# Patient Record
Sex: FEMALE | Race: WHITE | NOT HISPANIC OR LATINO | URBAN - METROPOLITAN AREA
[De-identification: names, ages, dates, MRNs, and addresses within clinical notes are randomized per-mention and may not be internally consistent; named-entity substitution may affect disease eponyms.]

---

## 2018-04-04 LAB
C TRACH RRNA SPEC DONR QL NAA+PROBE: NEGATIVE
EXTERNAL ABO GROUPING: (no result)
EXTERNAL ANTIBODY SCREEN: NORMAL
EXTERNAL HEMATOCRIT: 41 %
EXTERNAL HEMOGLOBIN: 13.5 G/DL
EXTERNAL HEPATITIS B SURFACE ANTIGEN: NEGATIVE
EXTERNAL PLATELET COUNT: 232 K/ΜL
EXTERNAL RH FACTOR: POSITIVE
EXTERNAL SYPHILIS RPR SCREEN: (no result)
HIV 1+2 AB+HIV1 P24 AG SERPL QL IA: NEGATIVE
N GONORRHOEA DNA SPEC QL NAA+PROBE: NEGATIVE
RUBV IGG SERPL IA-ACNC: (no result)

## 2018-07-10 ENCOUNTER — INITIAL PRENATAL (OUTPATIENT)
Dept: OBSTETRICS AND GYNECOLOGY | Facility: CLINIC | Age: 38
End: 2018-07-10

## 2018-07-10 VITALS
BODY MASS INDEX: 28 KG/M2 | HEIGHT: 64 IN | WEIGHT: 164 LBS | SYSTOLIC BLOOD PRESSURE: 110 MMHG | DIASTOLIC BLOOD PRESSURE: 60 MMHG

## 2018-07-10 DIAGNOSIS — Z3A.25 25 WEEKS GESTATION OF PREGNANCY: Primary | ICD-10-CM

## 2018-07-10 PROBLEM — F32.A DEPRESSION: Status: ACTIVE | Noted: 2018-07-10

## 2018-07-10 PROBLEM — Z98.891 H/O: CESAREAN SECTION: Status: ACTIVE | Noted: 2018-07-10

## 2018-07-10 PROBLEM — N80.9 ENDOMETRIOSIS DETERMINED BY LAPAROSCOPY: Status: ACTIVE | Noted: 2018-07-10

## 2018-07-10 PROBLEM — Z90.49 S/P CHOLECYSTECTOMY: Status: ACTIVE | Noted: 2018-07-10

## 2018-07-10 PROCEDURE — 99214 OFFICE O/P EST MOD 30 MIN: CPT | Performed by: OBSTETRICS & GYNECOLOGY

## 2018-07-10 RX ORDER — PRENATAL VIT/IRON FUM/FOLIC AC 27MG-0.8MG
1 TABLET ORAL DAILY
COMMUNITY

## 2018-07-10 RX ORDER — BUTALBITAL, ACETAMINOPHEN AND CAFFEINE 50; 325; 40 MG/1; MG/1; MG/1
TABLET ORAL
COMMUNITY
Start: 2018-06-26 | End: 2018-10-25

## 2018-07-10 NOTE — PROGRESS NOTES
Chief Complaint   Patient presents with   • Initial Prenatal Visit       HPI: Magnolia is a  currently at 24w5d who today reports the following:  Nausea - No; Vaginal bleeding -  No; Heartburn - No.    ROS:  GI: Constipation - No; Diarrhea - No    Neuro: Headache - No; Visual change - No      EXAM:  Vitals: See prenatal flowsheet   Abdomen: See prenatal flowsheet   Urine glucose/protein: See prenatal flowsheet   Pelvic: See prenatal flowsheet     Prenatal Labs  Lab Results   Component Value Date    HGB 13.5 2018    RUBELLAABIGG immune 2018    HEPBSAG Negative 2018    ABSCRN Normal 2018    [id number] negative 2018    CHLAMNAA negative 2018    NGONORRHON negative 2018       MDM:  Impression: 1. Supervision of high risk pregnancy  2. Previous C/S with route of delivery to be determined  3. History of  labor first pregnancy treated with terbutaline  4. AMA   Tests done today: 1. none   Topics discussed: 1. Continue with PNV's  2. Prenatal labs reviewed  3. Possible trial of labor after    4. History about 10 pound weight gain I'll start her on the 1800/2000-calorie diet sheet Evon we'll check Glucola on .    5. Last Evon to get copy of operative note  for  for breech    Tests scheduled today for her next visit:   none

## 2018-09-13 ENCOUNTER — DOCUMENTATION (OUTPATIENT)
Dept: OBSTETRICS AND GYNECOLOGY | Facility: CLINIC | Age: 38
End: 2018-09-13

## 2018-09-13 NOTE — PROGRESS NOTES
Phone call from Dr. Simon in the emergency room [facility].  Regarding Magnolia Lawson.  She's 34 weeks gestation presented with gush of fluid.  Urine is fairly normal with some white cells squamous cells 1+ bacteria.  He reports loss is closed nitrazine was negative ultrasound reveals what sounds like an KWADWO of 14.  Good fetal heart tones.  Suggested that she follow up to see me tomorrow in the office as long as no further leakage of fluid.  If so would need to come to labor and delivery.

## 2018-09-14 ENCOUNTER — ROUTINE PRENATAL (OUTPATIENT)
Dept: OBSTETRICS AND GYNECOLOGY | Facility: CLINIC | Age: 38
End: 2018-09-14

## 2018-09-14 VITALS — SYSTOLIC BLOOD PRESSURE: 114 MMHG | WEIGHT: 176 LBS | BODY MASS INDEX: 30.21 KG/M2 | DIASTOLIC BLOOD PRESSURE: 70 MMHG

## 2018-09-14 DIAGNOSIS — Z34.93 PRENATAL CARE IN THIRD TRIMESTER: Primary | ICD-10-CM

## 2018-09-14 DIAGNOSIS — Z98.891 H/O: CESAREAN SECTION: ICD-10-CM

## 2018-09-14 PROCEDURE — 87210 SMEAR WET MOUNT SALINE/INK: CPT | Performed by: OBSTETRICS & GYNECOLOGY

## 2018-09-14 PROCEDURE — 99214 OFFICE O/P EST MOD 30 MIN: CPT | Performed by: OBSTETRICS & GYNECOLOGY

## 2018-09-14 PROCEDURE — 83986 ASSAY PH BODY FLUID NOS: CPT | Performed by: OBSTETRICS & GYNECOLOGY

## 2018-09-14 RX ORDER — ASPIRIN 81 MG/1
81 TABLET ORAL DAILY
COMMUNITY
End: 2018-10-12 | Stop reason: HOSPADM

## 2018-09-14 NOTE — PROGRESS NOTES
Chief Complaint   Patient presents with   • Routine Prenatal Visit     clear vag discharge today       HPI: Magnolia is a  currently at 34w1d who today reports the following:  Contractions - YES - but less than 4/hour AND no associated change in vaginal discharge; Leaking - She was seen yesterday in [facility]'s or breathy candidate for possible ruptured membranes and amniotic fluid was normal on ultrasound there showed some bacteria but also squamous cells.; Vaginal bleeding -  No; Swelling of extremities - YES.    ROS:  GI: Nausea - No; Constipation - No; Diarrhea - No    Neuro: Headache - No; Visual change - No      EXAM:  Vitals: See prenatal flowsheet   Abdomen: See prenatal flowsheet   Urine glucose/protein: See prenatal flowsheet   Pelvic: No evidence of ruptured membranes and ballotable    MDM:   Impression: 1. Supervision of high risk pregnancy  2. Previous C/S with route of delivery to be determined  3. Question ruptured membranes.  However pH today is normal negative Valsalva some white watery discharge microscopically no hyphae seen.   Tests done today: 1. PH and microscopic wet mount   Topics discussed: 1. Continue with PNV's  2. Prenatal labs reviewed  3. labor signs and symptoms  4. Route of delivery   Tests scheduled today for her next visit:   GBS

## 2018-09-21 ENCOUNTER — ROUTINE PRENATAL (OUTPATIENT)
Dept: OBSTETRICS AND GYNECOLOGY | Facility: CLINIC | Age: 38
End: 2018-09-21

## 2018-09-21 VITALS — DIASTOLIC BLOOD PRESSURE: 70 MMHG | WEIGHT: 179 LBS | BODY MASS INDEX: 30.73 KG/M2 | SYSTOLIC BLOOD PRESSURE: 110 MMHG

## 2018-09-21 DIAGNOSIS — R04.0 LEFT-SIDED NOSEBLEED: ICD-10-CM

## 2018-09-21 DIAGNOSIS — Z3A.35 35 WEEKS GESTATION OF PREGNANCY: Primary | ICD-10-CM

## 2018-09-21 DIAGNOSIS — Z34.93 PRENATAL CARE IN THIRD TRIMESTER: ICD-10-CM

## 2018-09-21 DIAGNOSIS — K59.01 SLOW TRANSIT CONSTIPATION: ICD-10-CM

## 2018-09-21 DIAGNOSIS — Z98.891 H/O: CESAREAN SECTION: ICD-10-CM

## 2018-09-21 LAB — EXTERNAL GROUP B STREP ANTIGEN: POSITIVE

## 2018-09-21 PROCEDURE — 99213 OFFICE O/P EST LOW 20 MIN: CPT | Performed by: OBSTETRICS & GYNECOLOGY

## 2018-09-21 RX ORDER — POLYETHYLENE GLYCOL 3350 17 G/17G
17 POWDER, FOR SOLUTION ORAL DAILY
Qty: 30 EACH | Refills: 2 | Status: SHIPPED | OUTPATIENT
Start: 2018-09-21 | End: 2018-10-25 | Stop reason: SDUPTHER

## 2018-09-21 NOTE — PROGRESS NOTES
Chief Complaint   Patient presents with   • Routine Prenatal Visit   • Contractions     irreg       HPI: Magnolia is a  currently at 35w1d who today reports the following:  Contractions - No; Leaking - No; Vaginal bleeding -  No; Swelling of extremities - No.    ROS:  GI: Nausea - No; Constipation - YES; Diarrhea - No    Neuro: Headache - No; Visual change - No      EXAM:  Vitals: See prenatal flowsheet   Abdomen: See prenatal flowsheet   Urine glucose/protein: See prenatal flowsheet   Pelvic: See prenatal flowsheet   MDM:   Impression: 1. Supervision of high risk pregnancy  2. Previous C/S with planned  - she has signed a needs to bring in the paperwork for the  consent.    3. Chronic constipation   4. Nosebleed despite being off baby aspirin.  She takes a Benadryl at night.  Suggest she may want to use Mucinex/Flonase/Zyrtec    Tests done today: 1. GBS testing   Topics discussed: 1. Continue with PNV's  2. Prenatal labs reviewed  3. labor signs and symptoms  4. Bloody show from examination today cervix os 1 cm   5. Will Rx MiraLAX    Tests scheduled today for her next visit:   none

## 2018-09-26 PROBLEM — B95.1 POSITIVE GBS TEST: Status: ACTIVE | Noted: 2018-09-26

## 2018-09-26 PROBLEM — O34.219 PATIENT DESIRES VAGINAL BIRTH AFTER CESAREAN SECTION (VBAC): Status: ACTIVE | Noted: 2018-09-26

## 2018-09-26 LAB — HCV AB S/CO SERPL IA: NEGATIVE

## 2018-10-01 ENCOUNTER — HOSPITAL ENCOUNTER (OUTPATIENT)
Facility: HOSPITAL | Age: 38
Setting detail: OBSERVATION
LOS: 1 days | Discharge: HOME | End: 2018-10-01
Attending: OBSTETRICS & GYNECOLOGY | Admitting: OBSTETRICS & GYNECOLOGY

## 2018-10-01 VITALS
SYSTOLIC BLOOD PRESSURE: 124 MMHG | HEART RATE: 72 BPM | DIASTOLIC BLOOD PRESSURE: 82 MMHG | TEMPERATURE: 97.9 F | HEIGHT: 65 IN | BODY MASS INDEX: 29.99 KG/M2 | RESPIRATION RATE: 16 BRPM | WEIGHT: 180 LBS

## 2018-10-01 PROBLEM — O47.9 FALSE LABOR: Status: ACTIVE | Noted: 2018-10-01

## 2018-10-01 LAB
ABO GROUP BLD: NORMAL
ALP SERPL-CCNC: 116 U/L (ref 25–100)
ALT SERPL W P-5'-P-CCNC: 12 U/L (ref 7–40)
AMPHET+METHAMPHET UR QL: NEGATIVE
AMPHETAMINES UR QL: NEGATIVE
AST SERPL-CCNC: 15 U/L (ref 0–33)
BACTERIA UR QL AUTO: NORMAL /HPF
BARBITURATES UR QL SCN: POSITIVE
BENZODIAZ UR QL SCN: NEGATIVE
BILIRUB SERPL-MCNC: 0.2 MG/DL (ref 0.3–1.2)
BILIRUB UR QL STRIP: NEGATIVE
BLD GP AB SCN SERPL QL: NEGATIVE
BUPRENORPHINE SERPL-MCNC: NEGATIVE NG/ML
CANNABINOIDS SERPL QL: NEGATIVE
CLARITY UR: CLEAR
COCAINE UR QL: NEGATIVE
COLOR UR: YELLOW
CREAT BLD-MCNC: 0.58 MG/DL (ref 0.6–1.3)
DEPRECATED RDW RBC AUTO: 47.6 FL (ref 37–54)
ERYTHROCYTE [DISTWIDTH] IN BLOOD BY AUTOMATED COUNT: 14.1 % (ref 11.3–14.5)
GLUCOSE UR STRIP-MCNC: NEGATIVE MG/DL
HCT VFR BLD AUTO: 38.2 % (ref 34.5–44)
HGB BLD-MCNC: 12.8 G/DL (ref 11.5–15.5)
HGB UR QL STRIP.AUTO: ABNORMAL
HYALINE CASTS UR QL AUTO: NORMAL /LPF
KETONES UR QL STRIP: NEGATIVE
LDH SERPL-CCNC: 182 U/L (ref 120–246)
LEUKOCYTE ESTERASE UR QL STRIP.AUTO: NEGATIVE
MCH RBC QN AUTO: 31.5 PG (ref 27–31)
MCHC RBC AUTO-ENTMCNC: 33.5 G/DL (ref 32–36)
MCV RBC AUTO: 94.1 FL (ref 80–99)
METHADONE UR QL SCN: NEGATIVE
NITRITE UR QL STRIP: NEGATIVE
OPIATES UR QL: NEGATIVE
OXYCODONE UR QL SCN: NEGATIVE
PCP UR QL SCN: NEGATIVE
PH UR STRIP.AUTO: 8.5 [PH] (ref 5–8)
PLATELET # BLD AUTO: 156 10*3/MM3 (ref 150–450)
PMV BLD AUTO: 12.8 FL (ref 6–12)
POC AMNISURE: NEGATIVE
PROPOXYPH UR QL: NEGATIVE
PROT UR QL STRIP: NEGATIVE
RBC # BLD AUTO: 4.06 10*6/MM3 (ref 3.89–5.14)
RBC # UR: NORMAL /HPF
REF LAB TEST METHOD: NORMAL
RH BLD: POSITIVE
SP GR UR STRIP: 1.01 (ref 1–1.03)
SQUAMOUS #/AREA URNS HPF: NORMAL /HPF
T&S EXPIRATION DATE: NORMAL
TRICYCLICS UR QL SCN: NEGATIVE
URATE SERPL-MCNC: 5.2 MG/DL (ref 3.1–7.8)
UROBILINOGEN UR QL STRIP: ABNORMAL
WBC NRBC COR # BLD: 12.41 10*3/MM3 (ref 3.5–10.8)
WBC UR QL AUTO: NORMAL /HPF

## 2018-10-01 PROCEDURE — 59025 FETAL NON-STRESS TEST: CPT

## 2018-10-01 PROCEDURE — G0008 ADMIN INFLUENZA VIRUS VAC: HCPCS | Performed by: OBSTETRICS & GYNECOLOGY

## 2018-10-01 PROCEDURE — 80306 DRUG TEST PRSMV INSTRMNT: CPT | Performed by: OBSTETRICS & GYNECOLOGY

## 2018-10-01 PROCEDURE — 96361 HYDRATE IV INFUSION ADD-ON: CPT

## 2018-10-01 PROCEDURE — 85027 COMPLETE CBC AUTOMATED: CPT | Performed by: OBSTETRICS & GYNECOLOGY

## 2018-10-01 PROCEDURE — 86901 BLOOD TYPING SEROLOGIC RH(D): CPT | Performed by: OBSTETRICS & GYNECOLOGY

## 2018-10-01 PROCEDURE — 84112 EVAL AMNIOTIC FLUID PROTEIN: CPT | Performed by: OBSTETRICS & GYNECOLOGY

## 2018-10-01 PROCEDURE — 81001 URINALYSIS AUTO W/SCOPE: CPT | Performed by: OBSTETRICS & GYNECOLOGY

## 2018-10-01 PROCEDURE — 82247 BILIRUBIN TOTAL: CPT | Performed by: OBSTETRICS & GYNECOLOGY

## 2018-10-01 PROCEDURE — 86850 RBC ANTIBODY SCREEN: CPT | Performed by: OBSTETRICS & GYNECOLOGY

## 2018-10-01 PROCEDURE — G0378 HOSPITAL OBSERVATION PER HR: HCPCS

## 2018-10-01 PROCEDURE — 83615 LACTATE (LD) (LDH) ENZYME: CPT | Performed by: OBSTETRICS & GYNECOLOGY

## 2018-10-01 PROCEDURE — 84075 ASSAY ALKALINE PHOSPHATASE: CPT | Performed by: OBSTETRICS & GYNECOLOGY

## 2018-10-01 PROCEDURE — 84550 ASSAY OF BLOOD/URIC ACID: CPT | Performed by: OBSTETRICS & GYNECOLOGY

## 2018-10-01 PROCEDURE — 84460 ALANINE AMINO (ALT) (SGPT): CPT | Performed by: OBSTETRICS & GYNECOLOGY

## 2018-10-01 PROCEDURE — 25010000002 INFLUENZA VAC SUBUNIT QUAD 0.5 ML SUSPENSION PREFILLED SYRINGE: Performed by: OBSTETRICS & GYNECOLOGY

## 2018-10-01 PROCEDURE — 90661 CCIIV3 VAC ABX FR 0.5 ML IM: CPT | Performed by: OBSTETRICS & GYNECOLOGY

## 2018-10-01 PROCEDURE — G0379 DIRECT REFER HOSPITAL OBSERV: HCPCS

## 2018-10-01 PROCEDURE — 87086 URINE CULTURE/COLONY COUNT: CPT | Performed by: OBSTETRICS & GYNECOLOGY

## 2018-10-01 PROCEDURE — 86900 BLOOD TYPING SEROLOGIC ABO: CPT | Performed by: OBSTETRICS & GYNECOLOGY

## 2018-10-01 PROCEDURE — 84450 TRANSFERASE (AST) (SGOT): CPT | Performed by: OBSTETRICS & GYNECOLOGY

## 2018-10-01 PROCEDURE — G0480 DRUG TEST DEF 1-7 CLASSES: HCPCS | Performed by: OBSTETRICS & GYNECOLOGY

## 2018-10-01 PROCEDURE — 25010000002 BUTORPHANOL PER 1 MG: Performed by: OBSTETRICS & GYNECOLOGY

## 2018-10-01 PROCEDURE — 96374 THER/PROPH/DIAG INJ IV PUSH: CPT

## 2018-10-01 PROCEDURE — 82565 ASSAY OF CREATININE: CPT | Performed by: OBSTETRICS & GYNECOLOGY

## 2018-10-01 RX ORDER — SODIUM CHLORIDE, SODIUM LACTATE, POTASSIUM CHLORIDE, CALCIUM CHLORIDE 600; 310; 30; 20 MG/100ML; MG/100ML; MG/100ML; MG/100ML
125 INJECTION, SOLUTION INTRAVENOUS CONTINUOUS
Status: DISCONTINUED | OUTPATIENT
Start: 2018-10-01 | End: 2018-10-01 | Stop reason: HOSPADM

## 2018-10-01 RX ORDER — BUTORPHANOL TARTRATE 1 MG/ML
2 INJECTION, SOLUTION INTRAMUSCULAR; INTRAVENOUS ONCE
Status: COMPLETED | OUTPATIENT
Start: 2018-10-01 | End: 2018-10-01

## 2018-10-01 RX ADMIN — BUTORPHANOL TARTRATE 2 MG: 1 INJECTION, SOLUTION INTRAMUSCULAR; INTRAVENOUS at 05:24

## 2018-10-01 RX ADMIN — MINERAL OIL, PETROLATUM, PHENYLEPHRINE HYDROCHLORIDE: 140; 749; 2.5 OINTMENT RECTAL; TOPICAL at 08:42

## 2018-10-01 RX ADMIN — WITCH HAZEL: 500 SOLUTION RECTAL; TOPICAL at 08:41

## 2018-10-01 RX ADMIN — INFLUENZA A VIRUS A/SINGAPORE/GP1908/2015 IVR-180 (H1N1) ANTIGEN (MDCK CELL DERIVED, PROPIOLACTONE INACTIVATED), INFLUENZA A VIRUS A/NORTH CAROLINA/04/2016 (H3N2) HEMAGGLUTININ ANTIGEN (MDCK CELL DERIVED, PROPIOLACTONE INACTIVATED), INFLUENZA B VIRUS B/IOWA/06/2017 HEMAGGLUTININ ANTIGEN (MDCK CELL DERIVED, PROPIOLACTONE INACTIVATED), INFLUENZA B VIRUS B/SINGAPORE/INFTT-16-0610/2016 HEMAGGLUTININ ANTIGEN (MDCK CELL DERIVED, PROPIOLACTONE INACTIVATED) 0.5 ML: 15; 15; 15; 15 INJECTION, SUSPENSION INTRAMUSCULAR at 09:39

## 2018-10-01 RX ADMIN — SODIUM CHLORIDE, POTASSIUM CHLORIDE, SODIUM LACTATE AND CALCIUM CHLORIDE 125 ML/HR: 600; 310; 30; 20 INJECTION, SOLUTION INTRAVENOUS at 05:17

## 2018-10-01 RX ADMIN — SODIUM CHLORIDE, POTASSIUM CHLORIDE, SODIUM LACTATE AND CALCIUM CHLORIDE 999 ML/HR: 600; 310; 30; 20 INJECTION, SOLUTION INTRAVENOUS at 04:48

## 2018-10-01 NOTE — H&P
[facility]  Obstetric History and Physical    Chief Complaint   Patient presents with   • pelvic pressure     For last couple of weeks   • Contractions     Pt went to hospital for pelvic pressure and back pain, contractions started at [facility]. Pt was 2 cm at [facility].         Patient is a 38 y.o. female  currently at 36w4d, who presents as a transfer from [facility]   She is a patient of Dr. Raya.  She presented to that hospital due to pelvic pressure and lower back pain.   While there, she started having contractions every few minutes.  She has a significant history of a primary C/S 14 years ago for breech.  She would like to TOLAC with this pregnancy if possible.  She says she has not had any other complications with this pregnancy although she look to have had limited prenatal care.   She also thought she may have started leaking in transit.   Her AmniSure is negative.   She denies bleeding.  Contractions are every 2 minutes currently.  Her platelets were 130s at the OSH.  She denies HA, RUQ pan and vision changes        .       Prenatal Information:  Prenatal Results     Initial Prenatal Labs     Test Value Reference Range Date Time    Hemoglobin 13.5 g/dL g/dL 18     Hematocrit 41 % % 18     Platelets 232 K/µL K/µL 18     Rubella IgG immune   18     Hepatitis B SAg Negative   18     Hepatitis C Ab negative   18     RPR Non-Reactive   18     ABO A   18     Rh Positive   18     Antibody Screen Normal  Normal 18     HIV negative   18     Urine Culture        Gonorrhea negative   18     Chlamydia negative   18     TSH              2nd and 3rd Trimester     Test Value Reference Range Date Time    Hemoglobin (repeated)        Hematocrit (repeated)        GCT        Antibody Screen (repeated)        GTT Fasting        GTT 1 Hr        GTT 2 Hr        GTT 3 Hr        Group B Strep              Drug Screening     Test Value  Reference Range Date Time    Amphetamine Screen        Barbiturate Screen        Benzodiazepine Screen        Methadone Screen        Phencyclidine Screen        Opiates Screen        THC Screen        Cocaine Screen        Propoxyphene Screen        Buprenorphine Screen        Methamphetamine Screen        Oxycodone Screen        Tryicyclic Antidepressants Screen              Other (Risk screening)     Test Value Reference Range Date Time    Varicella IgG        Parvovirus IgG        CMV IgG        Cystic Fibrosis        Hemoglobin electrophoresis        NIPT        MSAFP-4        AFP (for NTD only)                  External Prenatal Results     Pregnancy Outside Results - Transcribed From Office Records - See Scanned Records For Details     Test Value Date Time    Hgb 13.5 g/dL 04/04/18     Hct 41 % 04/04/18     ABO A  04/04/18     Rh Positive  04/04/18     Antibody Screen Normal  04/04/18     Glucose Fasting GTT       Glucose Tolerance Test 1 hour       Glucose Tolerance Test 3 hour       Gonorrhea (discrete) negative  04/04/18     Chlamydia (discrete) negative  04/04/18     RPR Non-Reactive  04/04/18     VDRL       Syphilis Antibody       Rubella immune  04/04/18     HBsAg Negative  04/04/18     Herpes Simplex Virus PCR       Herpes Simplex VIrus Culture       HIV negative  04/04/18     Hep C RNA Quant PCR       Hep C Antibody negative  09/26/18     AFP       Group B Strep       GBS Susceptibility to Clindamycin       GBS Susceptibility to Erythromycin       Fetal Fibronectin       Genetic Testing, Maternal Blood             Drug Screening     Test Value Date Time    Urine Drug Screen       Amphetamine Screen       Barbiturate Screen       Benzodiazepine Screen       Methadone Screen       Phencyclidine Screen       Opiates Screen       THC Screen       Cocaine Screen       Propoxyphene Screen       Buprenorphine Screen       Methamphetamine Screen       Oxycodone Screen       Tricyclic Antidepressants Screen                     Past OB History:     Obstetric History       T0      L1     SAB1   TAB0   Ectopic0   Molar0   Multiple0   Live Births1       # Outcome Date GA Lbr Cisco/2nd Weight Sex Delivery Anes PTL Lv   3 Current            2 2017           1 Para 04 40w0d   M CS-LTranv  Y HILARIO      Name: James          Past Medical History: Past Medical History:   Diagnosis Date   • Endometriosis    • Migraine       Past Surgical History Past Surgical History:   Procedure Laterality Date   •  SECTION     • CHOLECYSTECTOMY     • DIAGNOSTIC LAPAROSCOPY      times 3      Family History: Family History   Problem Relation Age of Onset   • Ovarian cancer Maternal Grandmother       Social History:  reports that she has never smoked. She has never used smokeless tobacco.   reports that she does not drink alcohol.   reports that she does not use drugs.        Review of Systems:   Denies chest pain, SOA, muscle weakness and rashes.  All other pertnant positives and negatives are addressed in the HPI      Objective     Vital Signs Range for the last 24 hours  Temperature: Temp:  [97.9 °F (36.6 °C)-98.2 °F (36.8 °C)] 97.9 °F (36.6 °C)   Temp Source: Temp src: Oral   BP: BP: (124-132)/(77-82) 124/82   Pulse: Heart Rate:  [72-82] 72   Respirations: Resp:  [16-18] 16   SPO2:     O2 Amount (l/min):     O2 Devices     Weight: Weight:  [81.6 kg (180 lb)] 81.6 kg (180 lb)     Physical Examination: General appearance - oriented to person, place, and time and appears to be very uncomfortable with contractions.   Chest - no tachypnea, retractions or cyanosis  Heart - normal rate and regular rhythm, S1 and S2 normal  Abdomen - soft, nontender, nondistended, no masses or organomegaly  no rebound tenderness noted  bowel sounds normal  Initially had moderate contractions, but after IV hydration and pain medications, she now has very mild contractions that she does not feel.   Extremities - pedal edema 1  +    Presentation: Cephalic   Cervix: Exam by:     Dilation: Cervical Dilation (cm): 1   Effacement: Cervical Effacement: 70%   Station:       Fetal Heart Rate Assessment   Method:     Beats/min:     Baseline:  130s   Variability:  mod   Accels:  y   Decels:  n   Tracing Category:  1     Uterine Assessment   Method:     Frequency (min):  Q2-4   Ctx Count in 10 min:     Duration:     Intensity:  mild   Intensity by IUPC:     Resting Tone:     Resting Tone by IUPC:     [place] Units:       Laboratory Results:   Lab Results   Component Value Date     10/01/2018    HGB 12.8 10/01/2018    HCT 38.2 10/01/2018    WBC 12.41 (H) 10/01/2018     Lab Results   Component Value Date    HGB 12.8 10/01/2018     10/01/2018    AST 15 10/01/2018    ALT 12 10/01/2018     10/01/2018    URICACID 5.2 10/01/2018    CREATININE 0.58 (L) 10/01/2018         Assessment/Plan         Assessment & Plan    Assessment:  1.  Intrauterine pregnancy at 36w4d gestation with reactive fetal status.    2.  No signs or symptoms of labour or PPROM  3.  To be a TOLAC    Plan:  1. Reassuring fetal status  2. Currently no s/s of KELLY or PPROM  3. Will observe a little longer to assure that she is stable.  4. Dr. Raya notified and will be by later to see her and likely viridianarge      Jeremie Zuñiga MD  10/1/2018  4:40 AM

## 2018-10-01 NOTE — PLAN OF CARE
Problem: Patient Care Overview  Goal: Plan of Care Review  Outcome: Outcome(s) achieved Date Met: 10/01/18   10/01/18 0856   Coping/Psychosocial   Plan of Care Reviewed With patient;family   Plan of Care Review   Progress improving   OTHER   Outcome Summary Ctx frequency and intensity decreased. No cervical change noted as per Dr. Zuñiga. May d/c home with instrucitons to return as needed for continued ctx, LOF, VB, any other concerns.     Goal: Individualization and Mutuality  Outcome: Outcome(s) achieved Date Met: 10/01/18    Goal: Discharge Needs Assessment  Outcome: Outcome(s) achieved Date Met: 10/01/18   10/01/18 0856   Discharge Needs Assessment   Concerns to be Addressed denies needs/concerns at this time   Patient/Family Anticipates Transition to home with family   Patient/Family Anticipated Services at Transition none   Transportation Concerns car, none   Transportation Anticipated family or friend will provide   Anticipated Changes Related to Illness none   Equipment Needed After Discharge none   Disability   Equipment Currently Used at Home none

## 2018-10-01 NOTE — DISCHARGE INSTRUCTIONS
Keep all follow-up appointments.    Friday 10/12/18 @ 8:40AM  Call Dr. Raya' office with any questions or concerns.  Return to the hospital if you have any vaginal bleeding, if you think your water is broken, or if you are having regular contractions.

## 2018-10-01 NOTE — DISCHARGE SUMMARY
L Sandra CARRILLO  Patient Name: Magnolia Lawson  : 1980  MRN: [id number]  Date of Service: 10/1/2018  Referring Provider: Se Raya     ID: 38 y.o.  at 36w4d seen for c/o pressure and contractions. Cervix 1/70% and mild contractions; not breathing through them. Discussed when to return 1-5-1 rule and breathing through them.    Admission Diagnosis: False labor [O47.9]  False labor [O47.9]    Discharge Diagnosis:   Patient Active Problem List   Diagnosis   • Prenatal care in third trimester   • Low transverse  section 2004 at 38 6/7 breeech   • Endometriosis determined by laparoscopy  and    • S/P cholecystectomy 2004   • Depression   • Slow transit constipation   • Positive GBS test 2018   • Patient desires vaginal birth after  section ()    • False labor       Date of Admission: 10/1/2018  4:04 AM    Date of Discharge: 10/1/2018    Discharge Condition: Stable    Discharge to: Home    Discharge Medications:      Discharge Medications      Continue These Medications      Instructions Start Date   aspirin 81 MG EC tablet   81 mg, Oral, Daily      butalbital-acetaminophen-caffeine -40 MG per tablet  Commonly known as:  FIORICET, ESGIC   No dose, route, or frequency recorded.      CALCIUM 500 + D3 PO   Oral      polyethylene glycol powder  Commonly known as:  MIRALAX   17 g, Oral, Daily, Dissolve in large glass of water and take daily      prenatal vitamin 27-0.8 27-0.8 MG tablet tablet   1 tablet, Oral, Daily             Discharge Diet:     Discharge Activity:    Follow up appointments:   Your Scheduled Appointments    Oct 03, 2018 11:30 AM EDT  OB FOLLOWUP with Se Raya MD  [facility] Post (--) [address] [place] [phone]          Hospital summary:          .  Her condition was determined to be appropriate for outpatient management and she was discharged home in stable  condition.  She was instructed to follow up in 1 week with Dr Raya, she may reschedule her Weds appt in 2 days for next week..    Se Raya MD  8:15 AM

## 2018-10-02 LAB — REF LAB TEST METHOD: NORMAL

## 2018-10-03 ENCOUNTER — NURSE TRIAGE (OUTPATIENT)
Dept: CALL CENTER | Facility: HOSPITAL | Age: 38
End: 2018-10-03

## 2018-10-03 LAB — BACTERIA SPEC AEROBE CULT: NORMAL

## 2018-10-04 ENCOUNTER — DOCUMENTATION (OUTPATIENT)
Dept: OBSTETRICS AND GYNECOLOGY | Facility: CLINIC | Age: 38
End: 2018-10-04

## 2018-10-04 NOTE — PROGRESS NOTES
I left a voicemail regarding her swelling- my phone volume was too low for me to hear the page ~ [place] last night. I saw it at 0630; read note regarding edema. Told her if anyheadache, visual symptoms, nausea or pain to come in and get BP check and urinary protein. We can work her in to be seen sooner than her next appt. She was at L&D Monday October 1st.

## 2018-10-04 NOTE — TELEPHONE ENCOUNTER
"    Reason for Disposition  • [1] Pregnant 23 or more weeks AND [2] baby is moving less today (e.g., kick count < 5 in 1 hour or < 10 in 2 hours)    Additional Information  • Negative: Severe difficulty breathing (e.g., struggling for each breath, speaks in single words)  • Negative: Sounds like a life-threatening emergency to the triager  • Negative: Followed a leg injury  • Negative: [1] Small area of swelling AND [2] followed an insect bite to the area  • Negative: Swelling only of ankle  • Negative: Swelling only of knee  • Negative: SEVERE leg swelling (e.g., swelling extends above knee, entire leg is swollen)  • Negative: Chest pain  • Negative: [1] Difficulty breathing AND [2] new onset or worsening  • Negative: [1] Pregnant > 20 weeks AND [2] new blurred vision or vision changes  • Negative: [1] Pregnant > 20 weeks AND [2] severe headache AND [3] not relieved with acetaminophen (e.g., Tylenol)  • Negative: [1] Pregnant > 20 weeks AND [2] upper abdominal pain lasts > 1 hour  • Negative: [1] Red area or streak AND [2] fever    Answer Assessment - Initial Assessment Questions  1. ONSET: \"When did the swelling start?\" (e.g., minutes, hours, days)      Tonight, 1800  2. LOCATION: \"What part of the leg is swollen?\"  \"Are both legs swollen or just one leg?\"      Feet, ankles and calves of both legs  3. SEVERITY: \"How bad is the swelling?\" (e.g., localized; mild, moderate, severe)   - Localized - small area of swelling localized to one leg   - MILD pedal edema - swelling limited to foot and ankle, pitting edema < 1/4 inch (6 mm) deep, rest and elevation eliminate most or all swelling   - MODERATE edema - swelling of lower leg to knee, pitting edema > 1/4 inch (6 mm) deep, rest and elevation only partially reduce swelling   - SEVERE edema - swelling extends above knee, facial or hand swelling present       Moderate, greater than 1/4 inch  4. REDNESS: \"Does the swelling look red or infected?\"      red  5. PAIN: \"Is " "the swelling painful to touch?\" If so, ask: \"How painful is it?\"   (Scale 1-10; mild, moderate or severe)      Denies pain, states they feel like they are going to bust when she stands up.  6. FEVER: \"Do you have a fever?\" If so, ask: \"What is it, how was it measured, and when did it start?\"       denies  7. CAUSE: \"What do you think is causing the leg swelling?\"      pregnancy  8. MEDICAL HISTORY: \"Do you have a history of blood clots, heart failure, kidney disease, liver failure, or cancer?\"      denies  9. OTHER SYMPTOMS: \"Do you have any other symptoms?\" (e.g., chest pain, difficulty breathing)      denies  10. LAURY: \"What date are you expecting to deliver?\"        10/25/18 second pregnancy    Protocols used: PREGNANCY - LEG SWELLING AND EDEMA-ADULT-AH      "

## 2018-10-04 NOTE — TELEPHONE ENCOUNTER
Called back to say the baby is moving more and wants to know if she should still go to L&D to be checked out.  Legs are still swollen, she states her legs feel like they are going to explode.  I looked up Dr. Raya's office number and transferred the call to that number so she could talk to the physician on call.    Reason for Disposition  • [1] Red area or streak AND [2] large (> 2 in. or 5 cm)    Additional Information  • Negative: Severe difficulty breathing (e.g., struggling for each breath, speaks in single words)  • Negative: Sounds like a life-threatening emergency to the triager  • Negative: Followed a leg injury  • Negative: [1] Small area of swelling AND [2] followed an insect bite to the area  • Negative: Swelling only of ankle  • Negative: Swelling only of knee  • Negative: SEVERE leg swelling (e.g., swelling extends above knee, entire leg is swollen)  • Negative: Chest pain  • Negative: [1] Difficulty breathing AND [2] new onset or worsening  • Negative: [1] Pregnant > 20 weeks AND [2] new blurred vision or vision changes  • Negative: [1] Pregnant > 20 weeks AND [2] severe headache AND [3] not relieved with acetaminophen (e.g., Tylenol)  • Negative: [1] Pregnant > 20 weeks AND [2] upper abdominal pain lasts > 1 hour  • Negative: [1] Pregnant 23 or more weeks AND [2] baby is moving less today (e.g., kick count < 5 in 1 hour or < 10 in 2 hours)  • Negative: [1] Red area or streak AND [2] fever  • Negative: [1] Swelling is painful to touch AND [2] fever  • Negative: [1] Cast on leg or ankle AND [2] now increased pain  • Negative: Patient sounds very sick or weak to the triager  • Negative: [1] Pregnant > 20 weeks AND [2] swelling of face, arm or hands  (Exception: slight puffiness of fingers)  • Negative: [1] Pregnant > 20 weeks AND [2] sudden weight gain (i.e., more than 3 lbs or 1.4 kg in one week)  • Negative: [1] Thigh or calf pain AND [2] only 1 side AND [3] present > 1 hour  • Negative: [1] Thigh,  "calf, or ankle swelling AND [2] only 1 side  • Negative: [1] Thigh, calf, or ankle swelling AND [2] bilateral AND [3] 1 side is more swollen    Answer Assessment - Initial Assessment Questions  1. ONSET: \"When did the swelling start?\" (e.g., minutes, hours, days)      tonight  2. LOCATION: \"What part of the leg is swollen?\"  \"Are both legs swollen or just one leg?\"      Feet, ankles, calves of both legs  3. SEVERITY: \"How bad is the swelling?\" (e.g., localized; mild, moderate, severe)   - Localized - small area of swelling localized to one leg   - MILD pedal edema - swelling limited to foot and ankle, pitting edema < 1/4 inch (6 mm) deep, rest and elevation eliminate most or all swelling   - MODERATE edema - swelling of lower leg to knee, pitting edema > 1/4 inch (6 mm) deep, rest and elevation only partially reduce swelling   - SEVERE edema - swelling extends above knee, facial or hand swelling present       moderate  4. REDNESS: \"Does the swelling look red or infected?\"      red  5. PAIN: \"Is the swelling painful to touch?\" If so, ask: \"How painful is it?\"   (Scale 1-10; mild, moderate or severe)      Denies pain until she stands, says they feel like they will explode  6. FEVER: \"Do you have a fever?\" If so, ask: \"What is it, how was it measured, and when did it start?\"       denies  7. CAUSE: \"What do you think is causing the leg swelling?\"      pregnancy  8. MEDICAL HISTORY: \"Do you have a history of blood clots, heart failure, kidney disease, liver failure, or cancer?\"      denies  9. OTHER SYMPTOMS: \"Do you have any other symptoms?\" (e.g., chest pain, difficulty breathing)      denies  10. LAURY: \"What date are you expecting to deliver?\"        End of this month    Protocols used: PREGNANCY - LEG SWELLING AND EDEMA-ADULT-AH      "

## 2018-10-05 ENCOUNTER — HOSPITAL ENCOUNTER (OUTPATIENT)
Facility: HOSPITAL | Age: 38
Setting detail: OBSERVATION
Discharge: HOME | End: 2018-10-06
Attending: OBSTETRICS & GYNECOLOGY | Admitting: OBSTETRICS & GYNECOLOGY

## 2018-10-05 VITALS
DIASTOLIC BLOOD PRESSURE: 83 MMHG | RESPIRATION RATE: 18 BRPM | TEMPERATURE: 98.1 F | SYSTOLIC BLOOD PRESSURE: 129 MMHG | HEART RATE: 85 BPM

## 2018-10-05 PROCEDURE — G0378 HOSPITAL OBSERVATION PER HR: HCPCS

## 2018-10-05 PROCEDURE — 59025 FETAL NON-STRESS TEST: CPT

## 2018-10-06 PROBLEM — O47.1 FALSE LABOR AFTER 37 WEEKS OF GESTATION WITHOUT DELIVERY: Status: ACTIVE | Noted: 2018-10-06

## 2018-10-06 PROCEDURE — 99218 PR INITIAL OBSERVATION CARE/DAY 30 MINUTES: CPT | Performed by: OBSTETRICS & GYNECOLOGY

## 2018-10-06 RX ORDER — ZOLPIDEM TARTRATE 5 MG/1
5 TABLET ORAL ONCE
Status: COMPLETED | OUTPATIENT
Start: 2018-10-06 | End: 2018-10-06

## 2018-10-06 RX ADMIN — ZOLPIDEM TARTRATE 5 MG: 5 TABLET ORAL at 00:07

## 2018-10-06 NOTE — H&P
Magnolia ROBBINS Renny  1980  [id number]  [id number]    CC: contractions  HPI:  Patient is 38 y.o. white female   currently at 37w2d  Presents with c/o uterine contractions.  Onset ~1700, intermittent, non-radiating, variable intensity, denies assoc vag bleeding or SROM.  Good FM.  PNC comp by Adv mat age and prev  (pt desires ).    PMH:  Current meds: PNV, Ca, vit D  Illnesses: migraines, endometriosis  Surgeries:  (breech), lap papi, diag laparoscopy X 2  Allergies: NKDA    Past OB History:       Obstetric History       T1      L1     SAB1   TAB0   Ectopic0   Molar0   Multiple0   Live Births1       # Outcome Date GA Lbr Cisco/2nd Weight Sex Delivery Anes PTL Lv   3 Current            2 2017           1 Term 04 40w0d   M CS-LTranv  Y HILARIO      Name: James               SH: tob neg , EtOH neg, drugs neg  FH: heart dz pos , diabetes pos , cancer pos    General ROS: HA, N (mild), edema.   All other systems reviewed and are negative.      Physical Examination: General appearance - alert, well appearing, and in no distress  Vital signs - /83   Pulse 85   Temp 98.1 °F (36.7 °C) (Oral)   Resp 18   HEENT: normocephalic, atraumatic,oropharynx clear, appearance of ears and nose normal  Neck - supple, no significant adenopathy, no thyromegaly  Lymphatics - no palpable lymphadenopathy in the neck or groin, no hepatosplenomegaly  Chest - clear to auscultation, no wheezes, rales or rhonchi, respiratory effort non-labored  Heart - normal rate, regular rhythm, normal S1, S2, no murmurs, rubs, clicks or gallops, no JVD, tr lower extremity edema  Abdomen - soft, nontender, nondistended, no masses, no hepatosplenomegaly  no rebound tenderness noted, bowel sounds normal  Vaginal Exam: 1/70%/-2, no blood in vault ,external genitalia normal  Extremities - trace pedal edema noted, no calf tend  Skin -warm and dry, normal coloration and turgor, no rashes, no suspicious skin  lesions noted        Fetal monitoring: indication contractions , onset 2100 , offset 2200 , baseline 155 , mod BTB variability , multiple accels (15 X 15), no decels, irreg contractions, interpretation reactive NST    Radiology     Assessment 1)IUP 37 1/7 weeks    2)prev     3)adv mat age   4)false labor    Plan 1)observe     2)home     3)ambien    4)keep next sched appt    Denzel Loo MD  10/6/2018  12:00 AM

## 2018-10-10 ENCOUNTER — HOSPITAL ENCOUNTER (INPATIENT)
Facility: HOSPITAL | Age: 38
LOS: 2 days | Discharge: HOME | End: 2018-10-12
Attending: OBSTETRICS & GYNECOLOGY | Admitting: OBSTETRICS & GYNECOLOGY

## 2018-10-10 ENCOUNTER — ANESTHESIA EVENT (OUTPATIENT)
Dept: LABOR AND DELIVERY | Facility: HOSPITAL | Age: 38
End: 2018-10-10

## 2018-10-10 ENCOUNTER — ANESTHESIA (OUTPATIENT)
Dept: LABOR AND DELIVERY | Facility: HOSPITAL | Age: 38
End: 2018-10-10

## 2018-10-10 PROBLEM — O34.219 DESIRES VBAC (VAGINAL BIRTH AFTER CESAREAN) TRIAL: Status: ACTIVE | Noted: 2018-10-10

## 2018-10-10 PROBLEM — O34.219 VBAC, DELIVERED: Status: ACTIVE | Noted: 2018-10-10

## 2018-10-10 PROBLEM — O47.9 FALSE LABOR: Status: RESOLVED | Noted: 2018-10-01 | Resolved: 2018-10-10

## 2018-10-10 PROBLEM — O47.1 FALSE LABOR AFTER 37 WEEKS OF GESTATION WITHOUT DELIVERY: Status: RESOLVED | Noted: 2018-10-06 | Resolved: 2018-10-10

## 2018-10-10 LAB
ABO GROUP BLD: NORMAL
ALP SERPL-CCNC: 120 U/L (ref 25–100)
ALT SERPL W P-5'-P-CCNC: 13 U/L (ref 7–40)
AST SERPL-CCNC: 16 U/L (ref 0–33)
BILIRUB SERPL-MCNC: 0.3 MG/DL (ref 0.3–1.2)
BLD GP AB SCN SERPL QL: NEGATIVE
CREAT BLD-MCNC: 0.74 MG/DL (ref 0.6–1.3)
DEPRECATED RDW RBC AUTO: 48.9 FL (ref 37–54)
ERYTHROCYTE [DISTWIDTH] IN BLOOD BY AUTOMATED COUNT: 14.3 % (ref 11.3–14.5)
HCT VFR BLD AUTO: 41 % (ref 34.5–44)
HGB BLD-MCNC: 13.8 G/DL (ref 11.5–15.5)
LDH SERPL-CCNC: 196 U/L (ref 120–246)
MCH RBC QN AUTO: 31.8 PG (ref 27–31)
MCHC RBC AUTO-ENTMCNC: 33.7 G/DL (ref 32–36)
MCV RBC AUTO: 94.5 FL (ref 80–99)
PLATELET # BLD AUTO: 145 10*3/MM3 (ref 150–450)
PMV BLD AUTO: 13.7 FL (ref 6–12)
RBC # BLD AUTO: 4.34 10*6/MM3 (ref 3.89–5.14)
RH BLD: POSITIVE
T&S EXPIRATION DATE: NORMAL
URATE SERPL-MCNC: 6.2 MG/DL (ref 3.1–7.8)
WBC NRBC COR # BLD: 13.35 10*3/MM3 (ref 3.5–10.8)

## 2018-10-10 PROCEDURE — 82565 ASSAY OF CREATININE: CPT | Performed by: OBSTETRICS & GYNECOLOGY

## 2018-10-10 PROCEDURE — 86901 BLOOD TYPING SEROLOGIC RH(D): CPT | Performed by: OBSTETRICS & GYNECOLOGY

## 2018-10-10 PROCEDURE — C1755 CATHETER, INTRASPINAL: HCPCS | Performed by: ANESTHESIOLOGY

## 2018-10-10 PROCEDURE — 25010000002 FENTANYL CITRATE (PF) 100 MCG/2ML SOLUTION: Performed by: NURSE ANESTHETIST, CERTIFIED REGISTERED

## 2018-10-10 PROCEDURE — 25010000002 ROPIVACAINE PER 1 MG: Performed by: NURSE ANESTHETIST, CERTIFIED REGISTERED

## 2018-10-10 PROCEDURE — 0KQM0ZZ REPAIR PERINEUM MUSCLE, OPEN APPROACH: ICD-10-PCS | Performed by: OBSTETRICS & GYNECOLOGY

## 2018-10-10 PROCEDURE — 51703 INSERT BLADDER CATH COMPLEX: CPT

## 2018-10-10 PROCEDURE — 85027 COMPLETE CBC AUTOMATED: CPT | Performed by: OBSTETRICS & GYNECOLOGY

## 2018-10-10 PROCEDURE — C1755 CATHETER, INTRASPINAL: HCPCS

## 2018-10-10 PROCEDURE — 83615 LACTATE (LD) (LDH) ENZYME: CPT | Performed by: OBSTETRICS & GYNECOLOGY

## 2018-10-10 PROCEDURE — 82247 BILIRUBIN TOTAL: CPT | Performed by: OBSTETRICS & GYNECOLOGY

## 2018-10-10 PROCEDURE — 84460 ALANINE AMINO (ALT) (SGPT): CPT | Performed by: OBSTETRICS & GYNECOLOGY

## 2018-10-10 PROCEDURE — 84075 ASSAY ALKALINE PHOSPHATASE: CPT | Performed by: OBSTETRICS & GYNECOLOGY

## 2018-10-10 PROCEDURE — 86850 RBC ANTIBODY SCREEN: CPT | Performed by: OBSTETRICS & GYNECOLOGY

## 2018-10-10 PROCEDURE — 86900 BLOOD TYPING SEROLOGIC ABO: CPT | Performed by: OBSTETRICS & GYNECOLOGY

## 2018-10-10 PROCEDURE — 84550 ASSAY OF BLOOD/URIC ACID: CPT | Performed by: OBSTETRICS & GYNECOLOGY

## 2018-10-10 PROCEDURE — 25010000002 PENICILLIN G POTASSIUM PER 600000 UNITS: Performed by: OBSTETRICS & GYNECOLOGY

## 2018-10-10 PROCEDURE — 84450 TRANSFERASE (AST) (SGOT): CPT | Performed by: OBSTETRICS & GYNECOLOGY

## 2018-10-10 PROCEDURE — 59025 FETAL NON-STRESS TEST: CPT

## 2018-10-10 RX ORDER — METHYLERGONOVINE MALEATE 0.2 MG/ML
200 INJECTION INTRAVENOUS ONCE AS NEEDED
Status: DISCONTINUED | OUTPATIENT
Start: 2018-10-10 | End: 2018-10-10 | Stop reason: HOSPADM

## 2018-10-10 RX ORDER — LANOLIN 100 %
OINTMENT (GRAM) TOPICAL
Status: DISCONTINUED | OUTPATIENT
Start: 2018-10-10 | End: 2018-10-12 | Stop reason: HOSPADM

## 2018-10-10 RX ORDER — HYDROCODONE BITARTRATE AND ACETAMINOPHEN 7.5; 325 MG/1; MG/1
1 TABLET ORAL EVERY 4 HOURS PRN
Status: DISCONTINUED | OUTPATIENT
Start: 2018-10-10 | End: 2018-10-12 | Stop reason: HOSPADM

## 2018-10-10 RX ORDER — TRISODIUM CITRATE DIHYDRATE AND CITRIC ACID MONOHYDRATE 500; 334 MG/5ML; MG/5ML
30 SOLUTION ORAL ONCE
Status: DISCONTINUED | OUTPATIENT
Start: 2018-10-10 | End: 2018-10-10

## 2018-10-10 RX ORDER — SODIUM CHLORIDE 0.9 % (FLUSH) 0.9 %
3 SYRINGE (ML) INJECTION EVERY 12 HOURS SCHEDULED
Status: DISCONTINUED | OUTPATIENT
Start: 2018-10-10 | End: 2018-10-10

## 2018-10-10 RX ORDER — FAMOTIDINE 10 MG/ML
20 INJECTION, SOLUTION INTRAVENOUS ONCE AS NEEDED
Status: DISCONTINUED | OUTPATIENT
Start: 2018-10-10 | End: 2018-10-10

## 2018-10-10 RX ORDER — DIPHENHYDRAMINE HYDROCHLORIDE 50 MG/ML
12.5 INJECTION INTRAMUSCULAR; INTRAVENOUS EVERY 8 HOURS PRN
Status: DISCONTINUED | OUTPATIENT
Start: 2018-10-10 | End: 2018-10-10

## 2018-10-10 RX ORDER — MAGNESIUM CARB/ALUMINUM HYDROX 105-160MG
30 TABLET,CHEWABLE ORAL ONCE
Status: DISCONTINUED | OUTPATIENT
Start: 2018-10-10 | End: 2018-10-10

## 2018-10-10 RX ORDER — ACETAMINOPHEN 650 MG/1
650 SUPPOSITORY RECTAL EVERY 4 HOURS PRN
Status: DISCONTINUED | OUTPATIENT
Start: 2018-10-10 | End: 2018-10-12 | Stop reason: HOSPADM

## 2018-10-10 RX ORDER — KETOROLAC TROMETHAMINE 30 MG/ML
30 INJECTION, SOLUTION INTRAMUSCULAR; INTRAVENOUS EVERY 6 HOURS PRN
Status: DISCONTINUED | OUTPATIENT
Start: 2018-10-10 | End: 2018-10-12 | Stop reason: HOSPADM

## 2018-10-10 RX ORDER — NIFEDIPINE 10 MG/1
10 CAPSULE ORAL ONCE
Status: DISCONTINUED | OUTPATIENT
Start: 2018-10-10 | End: 2018-10-12 | Stop reason: HOSPADM

## 2018-10-10 RX ORDER — LIDOCAINE HYDROCHLORIDE 10 MG/ML
5 INJECTION, SOLUTION EPIDURAL; INFILTRATION; INTRACAUDAL; PERINEURAL AS NEEDED
Status: DISCONTINUED | OUTPATIENT
Start: 2018-10-10 | End: 2018-10-10

## 2018-10-10 RX ORDER — SODIUM CHLORIDE, SODIUM LACTATE, POTASSIUM CHLORIDE, CALCIUM CHLORIDE 600; 310; 30; 20 MG/100ML; MG/100ML; MG/100ML; MG/100ML
125 INJECTION, SOLUTION INTRAVENOUS CONTINUOUS
Status: DISCONTINUED | OUTPATIENT
Start: 2018-10-10 | End: 2018-10-12 | Stop reason: HOSPADM

## 2018-10-10 RX ORDER — NALOXONE HCL 0.4 MG/ML
0.1 VIAL (ML) INJECTION
Status: DISCONTINUED | OUTPATIENT
Start: 2018-10-10 | End: 2018-10-12 | Stop reason: HOSPADM

## 2018-10-10 RX ORDER — HYDROCODONE BITARTRATE AND ACETAMINOPHEN 5; 325 MG/1; MG/1
1 TABLET ORAL EVERY 4 HOURS PRN
Status: DISCONTINUED | OUTPATIENT
Start: 2018-10-10 | End: 2018-10-12 | Stop reason: HOSPADM

## 2018-10-10 RX ORDER — MISOPROSTOL 200 UG/1
600 TABLET ORAL ONCE AS NEEDED
Status: DISCONTINUED | OUTPATIENT
Start: 2018-10-10 | End: 2018-10-12 | Stop reason: HOSPADM

## 2018-10-10 RX ORDER — MISOPROSTOL 200 UG/1
800 TABLET ORAL AS NEEDED
Status: DISCONTINUED | OUTPATIENT
Start: 2018-10-10 | End: 2018-10-10 | Stop reason: HOSPADM

## 2018-10-10 RX ORDER — ACETAMINOPHEN 325 MG/1
650 TABLET ORAL EVERY 4 HOURS PRN
Status: DISCONTINUED | OUTPATIENT
Start: 2018-10-10 | End: 2018-10-12 | Stop reason: HOSPADM

## 2018-10-10 RX ORDER — PENICILLIN G 3000000 [IU]/50ML
3 INJECTION, SOLUTION INTRAVENOUS EVERY 4 HOURS
Status: DISCONTINUED | OUTPATIENT
Start: 2018-10-10 | End: 2018-10-10

## 2018-10-10 RX ORDER — BISACODYL 10 MG
10 SUPPOSITORY, RECTAL RECTAL DAILY PRN
Status: DISCONTINUED | OUTPATIENT
Start: 2018-10-11 | End: 2018-10-12 | Stop reason: HOSPADM

## 2018-10-10 RX ORDER — DOCUSATE SODIUM 100 MG/1
100 CAPSULE, LIQUID FILLED ORAL 2 TIMES DAILY PRN
Status: DISCONTINUED | OUTPATIENT
Start: 2018-10-11 | End: 2018-10-12 | Stop reason: HOSPADM

## 2018-10-10 RX ORDER — SODIUM CHLORIDE 0.9 % (FLUSH) 0.9 %
1-10 SYRINGE (ML) INJECTION AS NEEDED
Status: DISCONTINUED | OUTPATIENT
Start: 2018-10-10 | End: 2018-10-12 | Stop reason: HOSPADM

## 2018-10-10 RX ORDER — PROMETHAZINE HYDROCHLORIDE 25 MG/ML
12.5 INJECTION, SOLUTION INTRAMUSCULAR; INTRAVENOUS EVERY 6 HOURS PRN
Status: DISCONTINUED | OUTPATIENT
Start: 2018-10-10 | End: 2018-10-12 | Stop reason: HOSPADM

## 2018-10-10 RX ORDER — ONDANSETRON 4 MG/1
4 TABLET, FILM COATED ORAL EVERY 6 HOURS PRN
Status: DISCONTINUED | OUTPATIENT
Start: 2018-10-10 | End: 2018-10-12 | Stop reason: HOSPADM

## 2018-10-10 RX ORDER — FENTANYL CITRATE 50 UG/ML
INJECTION, SOLUTION INTRAMUSCULAR; INTRAVENOUS AS NEEDED
Status: DISCONTINUED | OUTPATIENT
Start: 2018-10-10 | End: 2018-10-10 | Stop reason: SURG

## 2018-10-10 RX ORDER — CARBOPROST TROMETHAMINE 250 UG/ML
250 INJECTION, SOLUTION INTRAMUSCULAR
Status: DISCONTINUED | OUTPATIENT
Start: 2018-10-10 | End: 2018-10-12 | Stop reason: HOSPADM

## 2018-10-10 RX ORDER — PRENATAL VIT/IRON FUM/FOLIC AC 27MG-0.8MG
1 TABLET ORAL DAILY
Status: DISCONTINUED | OUTPATIENT
Start: 2018-10-11 | End: 2018-10-10 | Stop reason: SDUPTHER

## 2018-10-10 RX ORDER — ONDANSETRON 2 MG/ML
4 INJECTION INTRAMUSCULAR; INTRAVENOUS EVERY 6 HOURS PRN
Status: DISCONTINUED | OUTPATIENT
Start: 2018-10-10 | End: 2018-10-12 | Stop reason: HOSPADM

## 2018-10-10 RX ORDER — SODIUM CHLORIDE, SODIUM LACTATE, POTASSIUM CHLORIDE, CALCIUM CHLORIDE 600; 310; 30; 20 MG/100ML; MG/100ML; MG/100ML; MG/100ML
125 INJECTION, SOLUTION INTRAVENOUS CONTINUOUS
Status: DISCONTINUED | OUTPATIENT
Start: 2018-10-10 | End: 2018-10-10 | Stop reason: SDUPTHER

## 2018-10-10 RX ORDER — SODIUM CHLORIDE 0.9 % (FLUSH) 0.9 %
3-10 SYRINGE (ML) INJECTION AS NEEDED
Status: DISCONTINUED | OUTPATIENT
Start: 2018-10-10 | End: 2018-10-10

## 2018-10-10 RX ORDER — PRENATAL VIT/IRON FUM/FOLIC AC 27MG-0.8MG
1 TABLET ORAL DAILY
Status: DISCONTINUED | OUTPATIENT
Start: 2018-10-11 | End: 2018-10-12 | Stop reason: HOSPADM

## 2018-10-10 RX ORDER — METOCLOPRAMIDE HYDROCHLORIDE 5 MG/ML
10 INJECTION INTRAMUSCULAR; INTRAVENOUS ONCE AS NEEDED
Status: DISCONTINUED | OUTPATIENT
Start: 2018-10-10 | End: 2018-10-10

## 2018-10-10 RX ORDER — IBUPROFEN 600 MG/1
600 TABLET ORAL EVERY 6 HOURS PRN
Status: DISCONTINUED | OUTPATIENT
Start: 2018-10-10 | End: 2018-10-12 | Stop reason: HOSPADM

## 2018-10-10 RX ORDER — LIDOCAINE HYDROCHLORIDE AND EPINEPHRINE 20; 5 MG/ML; UG/ML
INJECTION, SOLUTION EPIDURAL; INFILTRATION; INTRACAUDAL; PERINEURAL AS NEEDED
Status: DISCONTINUED | OUTPATIENT
Start: 2018-10-10 | End: 2018-10-10 | Stop reason: SURG

## 2018-10-10 RX ORDER — ZOLPIDEM TARTRATE 5 MG/1
5 TABLET ORAL NIGHTLY PRN
Status: DISCONTINUED | OUTPATIENT
Start: 2018-10-10 | End: 2018-10-12 | Stop reason: HOSPADM

## 2018-10-10 RX ORDER — PROMETHAZINE HYDROCHLORIDE 12.5 MG/1
12.5 SUPPOSITORY RECTAL EVERY 6 HOURS PRN
Status: DISCONTINUED | OUTPATIENT
Start: 2018-10-10 | End: 2018-10-12 | Stop reason: HOSPADM

## 2018-10-10 RX ORDER — METHYLERGONOVINE MALEATE 0.2 MG/ML
200 INJECTION INTRAVENOUS ONCE AS NEEDED
Status: DISCONTINUED | OUTPATIENT
Start: 2018-10-10 | End: 2018-10-12 | Stop reason: HOSPADM

## 2018-10-10 RX ORDER — CARBOPROST TROMETHAMINE 250 UG/ML
250 INJECTION, SOLUTION INTRAMUSCULAR AS NEEDED
Status: DISCONTINUED | OUTPATIENT
Start: 2018-10-10 | End: 2018-10-10 | Stop reason: HOSPADM

## 2018-10-10 RX ORDER — ROPIVACAINE HYDROCHLORIDE 2 MG/ML
15 INJECTION, SOLUTION EPIDURAL; INFILTRATION; PERINEURAL CONTINUOUS
Status: DISCONTINUED | OUTPATIENT
Start: 2018-10-10 | End: 2018-10-10

## 2018-10-10 RX ORDER — PROMETHAZINE HYDROCHLORIDE 25 MG/1
25 TABLET ORAL EVERY 6 HOURS PRN
Status: DISCONTINUED | OUTPATIENT
Start: 2018-10-10 | End: 2018-10-12 | Stop reason: HOSPADM

## 2018-10-10 RX ORDER — SODIUM CHLORIDE, SODIUM LACTATE, POTASSIUM CHLORIDE, CALCIUM CHLORIDE 600; 310; 30; 20 MG/100ML; MG/100ML; MG/100ML; MG/100ML
125 INJECTION, SOLUTION INTRAVENOUS CONTINUOUS
Status: DISCONTINUED | OUTPATIENT
Start: 2018-10-10 | End: 2018-10-10

## 2018-10-10 RX ORDER — SIMETHICONE 80 MG
80 TABLET,CHEWABLE ORAL 4 TIMES DAILY PRN
Status: DISCONTINUED | OUTPATIENT
Start: 2018-10-10 | End: 2018-10-12 | Stop reason: HOSPADM

## 2018-10-10 RX ORDER — ONDANSETRON 2 MG/ML
4 INJECTION INTRAMUSCULAR; INTRAVENOUS ONCE AS NEEDED
Status: DISCONTINUED | OUTPATIENT
Start: 2018-10-10 | End: 2018-10-10

## 2018-10-10 RX ADMIN — PENICILLIN G 3 MILLION UNITS: 3000000 INJECTION, SOLUTION INTRAVENOUS at 19:16

## 2018-10-10 RX ADMIN — ROPIVACAINE HYDROCHLORIDE 15 ML/HR: 2 INJECTION, SOLUTION EPIDURAL; INFILTRATION at 11:00

## 2018-10-10 RX ADMIN — SODIUM CHLORIDE, POTASSIUM CHLORIDE, SODIUM LACTATE AND CALCIUM CHLORIDE 125 ML/HR: 600; 310; 30; 20 INJECTION, SOLUTION INTRAVENOUS at 13:56

## 2018-10-10 RX ADMIN — SODIUM CHLORIDE 5 MILLION UNITS: 900 INJECTION INTRAVENOUS at 10:57

## 2018-10-10 RX ADMIN — LIDOCAINE HYDROCHLORIDE,EPINEPHRINE BITARTRATE 2 ML: 20; .005 INJECTION, SOLUTION EPIDURAL; INFILTRATION; INTRACAUDAL; PERINEURAL at 10:54

## 2018-10-10 RX ADMIN — FENTANYL CITRATE 100 MCG: 50 INJECTION, SOLUTION INTRAMUSCULAR; INTRAVENOUS at 10:56

## 2018-10-10 RX ADMIN — LIDOCAINE HYDROCHLORIDE,EPINEPHRINE BITARTRATE 7 ML: 20; .005 INJECTION, SOLUTION EPIDURAL; INFILTRATION; INTRACAUDAL; PERINEURAL at 17:48

## 2018-10-10 RX ADMIN — SODIUM CHLORIDE, POTASSIUM CHLORIDE, SODIUM LACTATE AND CALCIUM CHLORIDE 1000 ML: 600; 310; 30; 20 INJECTION, SOLUTION INTRAVENOUS at 10:10

## 2018-10-10 RX ADMIN — SODIUM CHLORIDE, POTASSIUM CHLORIDE, SODIUM LACTATE AND CALCIUM CHLORIDE 125 ML/HR: 600; 310; 30; 20 INJECTION, SOLUTION INTRAVENOUS at 10:07

## 2018-10-10 RX ADMIN — PENICILLIN G 3 MILLION UNITS: 3000000 INJECTION, SOLUTION INTRAVENOUS at 15:04

## 2018-10-10 RX ADMIN — ROPIVACAINE HYDROCHLORIDE 8 ML: 5 INJECTION, SOLUTION EPIDURAL; INFILTRATION; PERINEURAL at 10:58

## 2018-10-10 RX ADMIN — IBUPROFEN 600 MG: 600 TABLET ORAL at 20:54

## 2018-10-10 RX ADMIN — SODIUM CHLORIDE, POTASSIUM CHLORIDE, SODIUM LACTATE AND CALCIUM CHLORIDE 125 ML/HR: 600; 310; 30; 20 INJECTION, SOLUTION INTRAVENOUS at 10:57

## 2018-10-10 RX ADMIN — HYDROCODONE BITARTRATE AND ACETAMINOPHEN 1 TABLET: 7.5; 325 TABLET ORAL at 20:54

## 2018-10-10 NOTE — PROGRESS NOTES
She has an anterior lip and has already been pushing some despite nursing telling her not to push, bilateral labial swelling  FHt's okay

## 2018-10-10 NOTE — ANESTHESIA PROCEDURE NOTES
Labor Epidural      Patient location during procedure: OB  Performed By  Anesthesiologist: VIJAY ALVAREZ  CRNA: MADHU BRICEÑO  Preanesthetic Checklist  Completed: patient identified, surgical consent, pre-op evaluation, timeout performed, IV checked, risks and benefits discussed and monitors and equipment checked  Prep:  Pt Position:sitting  Sterile Tech:cap, gloves, mask and sterile barrier  Prep:DuraPrep  Monitoring:blood pressure monitoring  Epidural Block Procedure:  Approach:midline  Guidance:palpation technique  Location:L3-L4  Needle Type:Tuohy  Needle Gauge:17 G  Loss of Resistance Medium: saline  Loss of Resistance: 6cm  Cath Depth at skin:10 cm  Paresthesia: none  Aspiration:negative  Test Dose:negative  Number of Attempts: 1  Post Assessment:  Dressing:occlusive dressing applied and secured with tape  Pt Tolerance:patient tolerated the procedure well with no apparent complications  Complications:no

## 2018-10-10 NOTE — H&P
[facility]  Obstetric History and Physical    Chief Complaint   Patient presents with   • Leaking Fluid       Subjective     Patient is a 38 y.o. female  currently at 37w6d, who presents with SROM and desires .    Her prenatal care is complicated by  prior   desires .  Her previous obstetric/gynecological history is noted for is remarkable for  section for breech.    The following portions of the patients history were reviewed and updated as appropriate: allergies, past medical history, past surgical history and problem list .       Prenatal Information:  Prenatal Results     Initial Prenatal Labs     Test Value Reference Range Date Time    Hemoglobin 13.5 g/dL g/dL 18     Hematocrit 41 % % 18     Platelets 156 10*3/mm3 150 - 450 10*3/mm3 10/01/18 0452    Rubella IgG immune   18     Hepatitis B SAg Negative   18     Hepatitis C Ab negative   18     RPR Non-Reactive   18     ABO A   10/01/18 0521    Rh Positive   10/01/18 0521    Antibody Screen Normal  Normal 18     HIV negative   18     Urine Culture <10,000 CFU/mL Normal Urogenital Aleyda   10/01/18 0511    Gonorrhea negative   18     Chlamydia negative   18     TSH              2nd and 3rd Trimester     Test Value Reference Range Date Time    Hemoglobin (repeated) 12.8 g/dL 11.5 - 15.5 g/dL 10/01/18 0452    Hematocrit (repeated) 38.2 % 34.5 - 44.0 % 10/01/18 0452    GCT        Antibody Screen (repeated) Negative   10/01/18 0521    GTT Fasting        GTT 1 Hr        GTT 2 Hr        GTT 3 Hr        Group B Strep Positive   18           Drug Screening     Test Value Reference Range Date Time    Amphetamine Screen Negative  Negative 10/01/18 0511    Barbiturate Screen Positive  (A) Negative 10/01/18 0511    Benzodiazepine Screen Negative  Negative 10/01/18 0511    Methadone Screen Negative  Negative 10/01/18 0511    Phencyclidine Screen Negative  Negative 10/01/18 0511     Opiates Screen Negative  Negative 10/01/18 0511    THC Screen Negative  Negative 10/01/18 0511    Cocaine Screen Negative  Negative 10/01/18 0511    Propoxyphene Screen Negative  Negative 10/01/18 0511    Buprenorphine Screen Negative  Negative 10/01/18 0511    Methamphetamine Screen Negative  Negative 10/01/18 0511    Oxycodone Screen Negative  Negative 10/01/18 0511    Tryicyclic Antidepressants Screen Negative  Negative 10/01/18 0511          Other (Risk screening)     Test Value Reference Range Date Time    Varicella IgG        Parvovirus IgG        CMV IgG        Cystic Fibrosis        Hemoglobin electrophoresis        NIPT        MSAFP-4        AFP (for NTD only)                  External Prenatal Results     Pregnancy Outside Results - Transcribed From Office Records - See Scanned Records For Details     Test Value Date Time    Hgb 12.8 g/dL 10/01/18 0452    Hct 38.2 % 10/01/18 0452    ABO A  10/01/18 0521    Rh Positive  10/01/18 0521    Antibody Screen Negative  10/01/18 0521    Glucose Fasting GTT       Glucose Tolerance Test 1 hour       Glucose Tolerance Test 3 hour       Gonorrhea (discrete) negative  04/04/18     Chlamydia (discrete) negative  04/04/18     RPR Non-Reactive  04/04/18     VDRL       Syphilis Antibody       Rubella immune  04/04/18     HBsAg Negative  04/04/18     Herpes Simplex Virus PCR       Herpes Simplex VIrus Culture       HIV negative  04/04/18     Hep C RNA Quant PCR       Hep C Antibody negative  09/26/18     AFP       Group B Strep Positive  09/21/18     GBS Susceptibility to Clindamycin       GBS Susceptibility to Erythromycin       Fetal Fibronectin       Genetic Testing, Maternal Blood             Drug Screening     Test Value Date Time    Urine Drug Screen       Amphetamine Screen Negative  10/01/18 0511    Barbiturate Screen Positive  (A) 10/01/18 0511    Benzodiazepine Screen Negative  10/01/18 0511    Methadone Screen Negative  10/01/18 0511    Phencyclidine Screen  Negative  10/01/18 05    Opiates Screen Negative  10/01/18 05    THC Screen Negative  10/01/18 05    Cocaine Screen       Propoxyphene Screen Negative  10/01/18 05    Buprenorphine Screen Negative  10/01/18 05    Methamphetamine Screen       Oxycodone Screen Negative  10/01/18 0511    Tricyclic Antidepressants Screen Negative  10/01/18 0511                 Past OB History:     Obstetric History       T1      L1     SAB1   TAB0   Ectopic0   Molar0   Multiple0   Live Births1       # Outcome Date GA Lbr Cisco/2nd Weight Sex Delivery Anes PTL Lv   3 Current            2 SAB 2017           1 Term 04 40w0d   M CS-LTranv  Y HILARIO      Name: James          Past Medical History: Past Medical History:   Diagnosis Date   • Endometriosis    • Migraine       Past Surgical History Past Surgical History:   Procedure Laterality Date   •  SECTION     • CHOLECYSTECTOMY     • DIAGNOSTIC LAPAROSCOPY      times 3      Family History: Family History   Problem Relation Age of Onset   • Ovarian cancer Maternal Grandmother       Social History:  reports that she has never smoked. She has never used smokeless tobacco.   reports that she does not drink alcohol.   reports that she does not use drugs.        General ROS: Pertinent items are noted in HPI    Objective       Vital Signs Range for the last 24 hours  Temperature: Temp:  [98.1 °F (36.7 °C)] 98.1 °F (36.7 °C)   Temp Source: Temp src: Oral   BP: BP: (142)/(88) 142/88   Pulse: Heart Rate:  [86] 86   Respirations: Resp:  [18] 18   SPO2:     O2 Amount (l/min):     O2 Devices     Weight: Weight:  [81.6 kg (180 lb)] 81.6 kg (180 lb)     Physical Examination: General appearance - alert, well appearing, and in no distress and oriented to person, place, and time  Mental status - alert, oriented to person, place, and time, normal mood, behavior, speech, dress, motor activity, and thought processes  Chest - clear to auscultation, no wheezes, rales or  rhonchi, symmetric air entry  Heart - normal rate and regular rhythm  Abdomen - soft, nontender, gravid  Neurological - DTR's normal and symmetric  Musculoskeletal - no joint tenderness, deformity or swelling, no muscular tenderness noted  Extremities - pedal edema 1 +  Skin - normal coloration and turgor, no rashes, no suspicious skin lesions noted    Presentation: vertex   Cervix: Exam by: Method: sterile exam per RN   Dilation:   2 cm   Effacement: Cervical Effacement: 70-80%   Station:         Fetal Heart Rate Assessment   Method: Fetal HR Assessment Method: external   Beats/min:     Baseline:     Varibility:     Accels:     Decels:     Tracing Category:       Uterine Assessment   Method: Method: external tocotransducer   Frequency (min):     Ctx Count in 10 min:     Duration:     Intensity: Contraction Intensity: moderate by palpation   Intensity by IUPC:     Resting Tone:     Resting Tone by IUPC:     Center Units:         Assessment:  1.  Intrauterine pregnancy at 37w6d weeks gestation with reassuring fetal status.    2.  TOLAC with SROM  3.  Obstetrical history as noted above.    Plan:  1.admit and deliver; epidural  2. Plan of care has been reviewed with patient.  3.  Risks, benefits of treatment plan have been discussed.  4.  All questions have been answered.        Se Raya MD  10/10/2018  10:16 AM

## 2018-10-10 NOTE — ANESTHESIA PREPROCEDURE EVALUATION
Anesthesia Evaluation     Patient summary reviewed and Nursing notes reviewed   NPO Solid Status: > 6 hours  NPO Liquid Status: > 6 hours           Airway   Mallampati: II  TM distance: >3 FB  Neck ROM: full  No difficulty expected  Dental      Pulmonary - negative pulmonary ROS   Cardiovascular - negative cardio ROS        Neuro/Psych  (+) headaches (migraines), psychiatric history Depression,     GI/Hepatic/Renal/Endo      Musculoskeletal (-) negative ROS    Abdominal    Substance History - negative use     OB/GYN    (+) Pregnant,         Other - negative ROS                       Anesthesia Plan    ASA 2     epidural     Anesthetic plan, all risks, benefits, and alternatives have been provided, discussed and informed consent has been obtained with: patient.

## 2018-10-11 LAB
BASOPHILS # BLD AUTO: 0.02 10*3/MM3 (ref 0–0.2)
BASOPHILS NFR BLD AUTO: 0.1 % (ref 0–1)
DEPRECATED RDW RBC AUTO: 49.9 FL (ref 37–54)
EOSINOPHIL # BLD AUTO: 0.07 10*3/MM3 (ref 0–0.3)
EOSINOPHIL NFR BLD AUTO: 0.4 % (ref 0–3)
ERYTHROCYTE [DISTWIDTH] IN BLOOD BY AUTOMATED COUNT: 14.5 % (ref 11.3–14.5)
HCT VFR BLD AUTO: 32.5 % (ref 34.5–44)
HGB BLD-MCNC: 10.7 G/DL (ref 11.5–15.5)
IMM GRANULOCYTES # BLD: 0.07 10*3/MM3 (ref 0–0.03)
IMM GRANULOCYTES NFR BLD: 0.4 % (ref 0–0.6)
LYMPHOCYTES # BLD AUTO: 2.51 10*3/MM3 (ref 0.6–4.8)
LYMPHOCYTES NFR BLD AUTO: 15 % (ref 24–44)
MCH RBC QN AUTO: 31.6 PG (ref 27–31)
MCHC RBC AUTO-ENTMCNC: 32.9 G/DL (ref 32–36)
MCV RBC AUTO: 95.9 FL (ref 80–99)
MONOCYTES # BLD AUTO: 1.28 10*3/MM3 (ref 0–1)
MONOCYTES NFR BLD AUTO: 7.6 % (ref 0–12)
NEUTROPHILS # BLD AUTO: 12.89 10*3/MM3 (ref 1.5–8.3)
NEUTROPHILS NFR BLD AUTO: 76.9 % (ref 41–71)
PLATELET # BLD AUTO: 128 10*3/MM3 (ref 150–450)
PMV BLD AUTO: 13 FL (ref 6–12)
RBC # BLD AUTO: 3.39 10*6/MM3 (ref 3.89–5.14)
WBC NRBC COR # BLD: 16.77 10*3/MM3 (ref 3.5–10.8)

## 2018-10-11 PROCEDURE — 99024 POSTOP FOLLOW-UP VISIT: CPT | Performed by: OBSTETRICS & GYNECOLOGY

## 2018-10-11 PROCEDURE — 85025 COMPLETE CBC W/AUTO DIFF WBC: CPT | Performed by: OBSTETRICS & GYNECOLOGY

## 2018-10-11 RX ADMIN — HYDROCODONE BITARTRATE AND ACETAMINOPHEN 1 TABLET: 7.5; 325 TABLET ORAL at 20:51

## 2018-10-11 RX ADMIN — PRENATAL VIT W/ FE FUMARATE-FA TAB 27-0.8 MG 1 TABLET: 27-0.8 TAB at 08:18

## 2018-10-11 RX ADMIN — BENZOCAINE 1 APPLICATION: 5.6 OINTMENT TOPICAL at 00:20

## 2018-10-11 RX ADMIN — HYDROCODONE BITARTRATE AND ACETAMINOPHEN 1 TABLET: 7.5; 325 TABLET ORAL at 03:54

## 2018-10-11 RX ADMIN — IBUPROFEN 600 MG: 600 TABLET ORAL at 20:51

## 2018-10-11 RX ADMIN — HYDROCODONE BITARTRATE AND ACETAMINOPHEN 1 TABLET: 7.5; 325 TABLET ORAL at 08:18

## 2018-10-11 RX ADMIN — IBUPROFEN 600 MG: 600 TABLET ORAL at 03:53

## 2018-10-11 RX ADMIN — HYDROCORTISONE 2.5% 1 APPLICATION: 25 CREAM TOPICAL at 00:20

## 2018-10-11 RX ADMIN — Medication: at 00:20

## 2018-10-11 RX ADMIN — WITCH HAZEL 1 PAD: 500 SOLUTION RECTAL; TOPICAL at 00:20

## 2018-10-11 RX ADMIN — HYDROCODONE BITARTRATE AND ACETAMINOPHEN 1 TABLET: 7.5; 325 TABLET ORAL at 16:44

## 2018-10-11 RX ADMIN — IBUPROFEN 600 MG: 600 TABLET ORAL at 11:36

## 2018-10-11 NOTE — L&D DELIVERY NOTE
"10/10/2018    Patient:Magnolia Lawson    MR#:[id number]    Vaginal Delivery Note  38 y.o. yo female  at 37w6d status post primary  section for breech presentation.  Desired vaginal birth after .  Presented with spontaneous rupture membranes partially 7:00 this morning.  When active labor.  Delivered OA with a loose nuchal cord.  There was a second degree tear that extended into the right upper labia and minora.  Also is left vaginal sidewall tears.  Repaired with 3-0 chromic in layered approach.     Patient Active Problem List   Diagnosis   • Prenatal care in third trimester   • Low transverse  section 2004 at 38 6/7 breeech   • Endometriosis determined by laparoscopy  and    • S/P cholecystectomy 2004   • Depression   • Slow transit constipation   • Positive GBS test 2018   • Patient desires vaginal birth after  section ()    • False labor   • False labor after 37 weeks of gestation without delivery   • Normal labor and delivery       Delivery     Delivery: Vaginal, Spontaneous Delivery     YOB: 2017    Time of Birth: 7:43 PM      Anesthesia: Epidural     Delivering clinician: Se Raya    Forceps?   No   Vacuum? No    Shoulder dystocia present: No          Infant    Findings: male  infant \" Ramón\"     Infant observations: Weight: 3330 g (7 lb 5.5 oz)     Observations/Comments:         Apgars: 8   @ 1 minute /    9   @ 5 minutes         Placenta, Cord, and Fluid    Placenta delivered  Spontaneous  at  10/10/2018  7:47 PM     Cord: 3 vessels  present.   Nuchal Cord?  yes; Number of nuchal loops present:  1    Cord blood obtained: Yes    Cord gases obtained:  No              Repair    Episiotomy: No   Lacerations: Yes  Laceration Information  Laceration Repaired?   Perineal: None       Periurethral:         Labial: right  Yes    Sulcus:         Vaginal: Yes  Yes    Cervical: No          Suture used for repair: 3-0 " chromic gut   Estimated Blood Loss:   300 mls.   Suture used for repair: 3-0 chromic     Complications  none    Disposition  Mother to Mother Baby/Postpartum  in stable condition currently.  Baby to remains with mom  in stable condition currently.                Se Raya MD  10/10/18  8:19 PM

## 2018-10-11 NOTE — PLAN OF CARE
Problem: Patient Care Overview  Goal: Plan of Care Review  Outcome: Ongoing (interventions implemented as appropriate)   10/11/18 0942   Coping/Psychosocial   Plan of Care Reviewed With patient   Plan of Care Review   Progress improving       Problem: Postpartum (Vaginal Delivery) (Adult,Obstetrics,Pediatric)  Goal: Signs and Symptoms of Listed Potential Problems Will be Absent, Minimized or Managed (Postpartum)  Outcome: Ongoing (interventions implemented as appropriate)   10/11/18 0942   Goal/Outcome Evaluation   Problems Assessed (Postpartum Vaginal Delivery) all   Problems Present (Postpartum Vag Deliv) none

## 2018-10-11 NOTE — PLAN OF CARE
Problem: Patient Care Overview  Goal: Plan of Care Review  Outcome: Ongoing (interventions implemented as appropriate)   10/11/18 1530   Coping/Psychosocial   Plan of Care Reviewed With patient;spouse   Plan of Care Review   Progress no change   OTHER   Outcome Summary Patient reports infant has been breastfeeding well, but has been sluggish today after circumcision. She was encouraged to hold him skin-to-skin as much as possible and to feed him on demand.       Problem: Breastfeeding (Adult,Obstetrics,Pediatric)  Intervention: Support Exclusive Breastfeeding Success   10/11/18 1530   Reproductive Interventions   Breastfeeding Assistance feeding cue recognition promoted;feeding on demand promoted;support offered

## 2018-10-11 NOTE — ANESTHESIA POSTPROCEDURE EVALUATION
Patient: Magnolia Lawson    Procedure Summary     Date:  10/10/18 Room / Location:      Anesthesia Start:  1045 Anesthesia Stop:  1947    Procedure:  LABOR ANALGESIA Diagnosis:      Scheduled Providers:   Provider:  Justo Puga DO    Anesthesia Type:  epidural ASA Status:  2          Anesthesia Type: epidural  Last vitals  BP   141/83 (10/11/18 0700)   Temp   98 °F (36.7 °C) (10/11/18 0700)   Pulse   86 (10/11/18 0700)   Resp   20 (10/11/18 0700)     SpO2         Post Anesthesia Care and Evaluation    Patient location during evaluation: bedside  Patient participation: complete - patient participated  Level of consciousness: awake and alert  Pain management: adequate  Airway patency: patent  Anesthetic complications: No anesthetic complications    Cardiovascular status: acceptable  Respiratory status: acceptable  Hydration status: acceptable  Post Neuraxial Block status: Motor and sensory function returned to baseline and No signs or symptoms of PDPH

## 2018-10-11 NOTE — PROGRESS NOTES
10/11/2018  PPD #1    Subjective   Magnolia feels good.  She was a little bit shaky this morning when she is up to the bathroom.  Nursing discussed that she had not had any problems until now.  Patient describes her lochia less than menses.  Pain is well controlled       Objective   Temp: Temp:  [97.8 °F (36.6 °C)-100.1 °F (37.8 °C)] 98 °F (36.7 °C) Temp src: Oral   BP: BP: (108-156)/(57-96) 141/83        Pulse: Heart Rate:  [] 86  RR: Resp:  [16-20] 20    General:  No acute distress   Abdomen: Fundus firm and beneath umbilicus   Pelvis: deferred     Lab Results   Component Value Date    WBC 16.77 (H) 10/11/2018    HGB 10.7 (L) 10/11/2018    HCT 32.5 (L) 10/11/2018    MCV 95.9 10/11/2018     (L) 10/11/2018    HEPBSAG Negative 04/04/2018       Assessment  1. Stable after vaginal delivery.  2. Tenderness due to tearing of the right labia will add ice to that today  3. Some slight increased blood pressures yesterday evening.  Normal PEP yesterday morning    Plan  1. Routine postpartum care.  2. Probable discharge tomorrow  3. She would like her son circumcised.      This note has been electronically signed.    Se Raya MD  October 11, 2018

## 2018-10-12 VITALS
RESPIRATION RATE: 18 BRPM | HEIGHT: 65 IN | WEIGHT: 180 LBS | BODY MASS INDEX: 29.99 KG/M2 | TEMPERATURE: 98.3 F | DIASTOLIC BLOOD PRESSURE: 95 MMHG | HEART RATE: 88 BPM | SYSTOLIC BLOOD PRESSURE: 135 MMHG

## 2018-10-12 PROCEDURE — 99024 POSTOP FOLLOW-UP VISIT: CPT | Performed by: OBSTETRICS & GYNECOLOGY

## 2018-10-12 RX ORDER — IBUPROFEN 600 MG/1
600 TABLET ORAL EVERY 6 HOURS PRN
Qty: 30 TABLET | Refills: 0 | Status: SHIPPED | OUTPATIENT
Start: 2018-10-12 | End: 2018-10-13

## 2018-10-12 RX ORDER — ACETAMINOPHEN 650 MG/1
650 SUPPOSITORY RECTAL EVERY 4 HOURS PRN
Start: 2018-10-12 | End: 2018-10-25

## 2018-10-12 RX ORDER — HYDROCODONE BITARTRATE AND ACETAMINOPHEN 5; 325 MG/1; MG/1
1 TABLET ORAL EVERY 6 HOURS PRN
Qty: 20 TABLET | Refills: 0 | Status: SHIPPED | OUTPATIENT
Start: 2018-10-12 | End: 2018-10-20

## 2018-10-12 RX ORDER — ACETAMINOPHEN AND CODEINE PHOSPHATE 120; 12 MG/5ML; MG/5ML
1 SOLUTION ORAL DAILY
Qty: 28 TABLET | Refills: 5 | Status: SHIPPED | OUTPATIENT
Start: 2018-10-12 | End: 2018-10-13

## 2018-10-12 RX ADMIN — IBUPROFEN 600 MG: 600 TABLET ORAL at 07:24

## 2018-10-12 RX ADMIN — HYDROCORTISONE 2.5% 1 APPLICATION: 25 CREAM TOPICAL at 13:01

## 2018-10-12 RX ADMIN — HYDROCODONE BITARTRATE AND ACETAMINOPHEN 1 TABLET: 7.5; 325 TABLET ORAL at 12:25

## 2018-10-12 RX ADMIN — Medication 3 APPLICATION: at 12:26

## 2018-10-12 RX ADMIN — IBUPROFEN 600 MG: 600 TABLET ORAL at 12:59

## 2018-10-12 RX ADMIN — PRENATAL VIT W/ FE FUMARATE-FA TAB 27-0.8 MG 1 TABLET: 27-0.8 TAB at 08:31

## 2018-10-12 RX ADMIN — Medication: at 07:34

## 2018-10-12 RX ADMIN — DOCUSATE SODIUM 100 MG: 100 CAPSULE, LIQUID FILLED ORAL at 08:31

## 2018-10-12 RX ADMIN — HYDROCODONE BITARTRATE AND ACETAMINOPHEN 1 TABLET: 7.5; 325 TABLET ORAL at 07:24

## 2018-10-12 NOTE — PLAN OF CARE
Problem: Patient Care Overview  Goal: Plan of Care Review  Outcome: Ongoing (interventions implemented as appropriate)    Goal: Individualization and Mutuality  Outcome: Ongoing (interventions implemented as appropriate)      Problem: Postpartum (Vaginal Delivery) (Adult,Obstetrics,Pediatric)  Goal: Signs and Symptoms of Listed Potential Problems Will be Absent, Minimized or Managed (Postpartum)  Outcome: Ongoing (interventions implemented as appropriate)

## 2018-10-12 NOTE — DISCHARGE SUMMARY
Discharge Summary    Date of Admission: 10/10/2018  Date of Discharge:  10/12/2018      Patient: Magnolia Lawson      MR#:[id number]    Delivery Provider: Se Raya     Discharge Surgeon/OB: Se Raya MD    Presenting Problem/History of Present Illness  Normal labor and delivery [O80]     Patient Active Problem List   Diagnosis   • Prenatal care in third trimester   • Low transverse  section 2004 at 38 6/7 breeech   • Endometriosis determined by laparoscopy  and    • S/P cholecystectomy 2004   • Depression   • Slow transit constipation   • Positive GBS test 2018   • Patient desires vaginal birth after  section ()    • Desires  (vaginal birth after ) trial   • , delivered 10/10/18 7 lbs. 6 oz. male         Discharge Diagnosis: Vaginal delivery at 37w6d    Procedures:  Vaginal, Spontaneous Delivery     10/10/2018    7:43 PM        Discharge Date: 10/12/2018;     Hospital Course  Patient is a 38 y.o. female  at 37w6d status post vaginal delivery without complication. Postpartum the patient did well. She remained afebrile, with vital signs stable. She was ready for discharge on postpartum day 2.   She had a birth laceration extended to the upper right labia minora.  Discussed treatment with Tucks pads and Dermoplast spray sitz bath etc.      Infant:   male  fetus 3330 g (7 lb 5.5 oz)  with Apgar scores of 8  , 9   at five minutes.    Condition on Discharge:  Stable    Vital Signs  Temp:  [98 °F (36.7 °C)-98.3 °F (36.8 °C)] 98 °F (36.7 °C)  Heart Rate:  [] 93  Resp:  [18-20] 18  BP: (131-134)/(77-91) 131/85    Lab Results   Component Value Date    WBC 16.77 (H) 10/11/2018    HGB 10.7 (L) 10/11/2018    HCT 32.5 (L) 10/11/2018    MCV 95.9 10/11/2018     (L) 10/11/2018       Discharge Disposition  Home or Self Care    Discharge Medications     Discharge Medications      New Medications      Instructions Start Date   acetaminophen  650 MG suppository  Commonly known as:  TYLENOL   650 mg, Rectal, Every 4 Hours PRN      benzocaine 20 % rectal ointment  Commonly known as:  AMERICAINE   1 application, Rectal, As Needed      benzocaine-lanolin-aloe vera 20-0.5 % aerosol topical spray  Commonly known as:  DERMOPLAST   Topical, 4 Times Daily PRN      HYDROcodone-acetaminophen 5-325 MG per tablet  Commonly known as:  NORCO   1 tablet, Oral, Every 6 Hours PRN      ibuprofen 600 MG tablet  Commonly known as:  ADVIL,MOTRIN   600 mg, Oral, Every 6 Hours PRN      norethindrone 0.35 MG tablet  Commonly known as:  MICRONOR   1 tablet, Oral, Daily, Start October 28th      witch hazel-glycerin pad  Commonly known as:  TUCKS   1 each, Topical, As Needed         Continue These Medications      Instructions Start Date   butalbital-acetaminophen-caffeine -40 MG per tablet  Commonly known as:  FIORICET, ESGIC   No dose, route, or frequency recorded.      CALCIUM 500 + D3 PO   Oral      polyethylene glycol powder  Commonly known as:  MIRALAX   17 g, Oral, Daily, Dissolve in large glass of water and take daily      prenatal vitamin 27-0.8 27-0.8 MG tablet tablet   1 tablet, Oral, Daily         Stop These Medications    aspirin 81 MG EC tablet            Discharge Diet: regular     Activity at Discharge:   Activity Instructions     Pelvic Rest             Discharge instructions reviewed and understood by patient.  Follow-up Appointments    Additional Instructions for the Follow-ups that You Need to Schedule     Discharge Follow-up with Specialty: Evon MALONEY; 6 Weeks    As directed      Specialty:  Evon MALONEY    Follow Up:  6 Weeks    Follow Up Details:  Postpartum               Se Raya MD  10/12/18  8:26 AM  Csd

## 2018-10-12 NOTE — PROGRESS NOTES
Nursing is called with some borderline blood pressures.  Diastolics in the 89-95 range.  Systolics 130s to 140.  No symptoms.  Discussed that if she has any headaches visual symptoms nausea and vomiting to come back sooner otherwise I will see her back in 1 week for blood pressure check.

## 2018-10-13 RX ORDER — ACETAMINOPHEN AND CODEINE PHOSPHATE 120; 12 MG/5ML; MG/5ML
1 SOLUTION ORAL DAILY
Qty: 28 TABLET | Refills: 5 | Status: SHIPPED | OUTPATIENT
Start: 2018-10-13 | End: 2019-10-13

## 2018-10-13 RX ORDER — IBUPROFEN 600 MG/1
600 TABLET ORAL EVERY 6 HOURS PRN
Qty: 30 TABLET | Refills: 0 | Status: SHIPPED | OUTPATIENT
Start: 2018-10-13 | End: 2018-10-25

## 2018-10-14 ENCOUNTER — TELEPHONE (OUTPATIENT)
Dept: OBSTETRICS AND GYNECOLOGY | Facility: CLINIC | Age: 38
End: 2018-10-14

## 2018-10-14 NOTE — TELEPHONE ENCOUNTER
Patient called and she is at the hospital with her baby. Desire rx for tuck pads, dermoplast, ect. Rx sent to pharmacy at [facility].     Viola Spain MD

## 2018-10-15 NOTE — PAYOR COMM NOTE
"Komal Lawosn (38 y.o. Female)     Date of Birth Social Security Number Address Home Phone MRN    1980  [address] [place] [address] [phone] [id number]    [facility] Mar[employer] Status          None Single       Admission Date Admission Type Admitting Provider Attending Provider Department, Room/Bed    10/10/18 Elective Se Raya MD  [facility] MOTHER BABY 4B, N434/1    Discharge Date Discharge Disposition Discharge Destination        10/12/2018 Home or Self Care              Attending Provider:  (none)   Allergies:  No Known Allergies    Isolation:  None   Infection:  None   Code Status:  Prior    Ht:  165.1 cm (65\")   Wt:  81.6 kg (180 lb)    Admission Cmt:  None   Principal Problem:  None                Active Insurance as of 10/10/2018     Primary Coverage     Payor Plan Insurance Group Employer/Plan Group    [employer] [place] [employer]      Payor Plan Address Payor Plan Phone Number Effective From Effective To    [address] [address] [phone] 2018     [facility] [id number]       Subscriber Name Subscriber Birth Date Member ID       KOMAL LAWSON 1980 [id number]                 Emergency Contacts      (Rel.) Home Phone Work Phone Mobile Phone    Irving Fuentes (Partner) [phone] -- --                 Discharge Summary      Se Raya MD at 10/12/2018  8:26 AM          Discharge Summary    Date of Admission: 10/10/2018  Date of Discharge:  10/12/2018      Patient: Komal Lawson      MR#:[id number]    Delivery Provider: Se Raya     Discharge Surgeon/OB: Se Raya MD    Presenting Problem/History of Present Illness  Normal labor and delivery [O80]     Patient Active Problem List   Diagnosis   • Prenatal care in third trimester   • Low transverse  section 2004 at 38 6/7 breeech   • Endometriosis determined by laparoscopy  and    • S/P cholecystectomy 2004   • Depression   • Slow transit constipation "   • Positive GBS test 2018   • Patient desires vaginal birth after  section ()    • Desires  (vaginal birth after ) trial   • , delivered 10/10/18 7 lbs. 6 oz. male         Discharge Diagnosis: Vaginal delivery at 37w6d    Procedures:  Vaginal, Spontaneous Delivery     10/10/2018    7:43 PM        Discharge Date: 10/12/2018;     Hospital Course  Patient is a 38 y.o. female  at 37w6d status post vaginal delivery without complication. Postpartum the patient did well. She remained afebrile, with vital signs stable. She was ready for discharge on postpartum day 2.   She had a birth laceration extended to the upper right labia minora.  Discussed treatment with Tucks pads and Dermoplast spray sitz bath etc.      Infant:   male  fetus 3330 g (7 lb 5.5 oz)  with Apgar scores of 8  , 9   at five minutes.    Condition on Discharge:  Stable    Vital Signs  Temp:  [98 °F (36.7 °C)-98.3 °F (36.8 °C)] 98 °F (36.7 °C)  Heart Rate:  [] 93  Resp:  [18-20] 18  BP: (131-134)/(77-91) 131/85    Lab Results   Component Value Date    WBC 16.77 (H) 10/11/2018    HGB 10.7 (L) 10/11/2018    HCT 32.5 (L) 10/11/2018    MCV 95.9 10/11/2018     (L) 10/11/2018       Discharge Disposition  Home or Self Care    Discharge Medications     Discharge Medications      New Medications      Instructions Start Date   acetaminophen 650 MG suppository  Commonly known as:  TYLENOL   650 mg, Rectal, Every 4 Hours PRN      benzocaine 20 % rectal ointment  Commonly known as:  AMERICAINE   1 application, Rectal, As Needed      benzocaine-lanolin-aloe vera 20-0.5 % aerosol topical spray  Commonly known as:  DERMOPLAST   Topical, 4 Times Daily PRN      HYDROcodone-acetaminophen 5-325 MG per tablet  Commonly known as:  NORCO   1 tablet, Oral, Every 6 Hours PRN      ibuprofen 600 MG tablet  Commonly known as:  ADVIL,MOTRIN   600 mg, Oral, Every 6 Hours PRN      norethindrone 0.35 MG tablet  Commonly known  as:  MICRONOR   1 tablet, Oral, Daily, Start October 28th      witch hazel-glycerin pad  Commonly known as:  TUCKS   1 each, Topical, As Needed         Continue These Medications      Instructions Start Date   butalbital-acetaminophen-caffeine -40 MG per tablet  Commonly known as:  FIORICET, ESGIC   No dose, route, or frequency recorded.      CALCIUM 500 + D3 PO   Oral      polyethylene glycol powder  Commonly known as:  MIRALAX   17 g, Oral, Daily, Dissolve in large glass of water and take daily      prenatal vitamin 27-0.8 27-0.8 MG tablet tablet   1 tablet, Oral, Daily         Stop These Medications    aspirin 81 MG EC tablet            Discharge Diet: regular     Activity at Discharge:   Activity Instructions     Pelvic Rest             Discharge instructions reviewed and understood by patient.  Follow-up Appointments    Additional Instructions for the Follow-ups that You Need to Schedule     Discharge Follow-up with Specialty: Evon MALONEY; 6 Weeks    As directed      Specialty:  Evon MALONEY    Follow Up:  6 Weeks    Follow Up Details:  Postpartum               Se Raya MD  10/12/18  8:26 AM  Csd    Electronically signed by Se Raya MD at 10/12/2018  8:27 AM

## 2018-10-25 ENCOUNTER — LAB (OUTPATIENT)
Dept: LAB | Facility: HOSPITAL | Age: 38
End: 2018-10-25

## 2018-10-25 ENCOUNTER — OFFICE VISIT (OUTPATIENT)
Dept: OBSTETRICS AND GYNECOLOGY | Facility: CLINIC | Age: 38
End: 2018-10-25

## 2018-10-25 VITALS
SYSTOLIC BLOOD PRESSURE: 130 MMHG | WEIGHT: 168 LBS | BODY MASS INDEX: 27.96 KG/M2 | RESPIRATION RATE: 16 BRPM | DIASTOLIC BLOOD PRESSURE: 80 MMHG

## 2018-10-25 DIAGNOSIS — G43.011 INTRACTABLE MIGRAINE WITHOUT AURA AND WITH STATUS MIGRAINOSUS: ICD-10-CM

## 2018-10-25 LAB
ALP SERPL-CCNC: 84 U/L (ref 25–100)
ALT SERPL W P-5'-P-CCNC: 26 U/L (ref 7–40)
AST SERPL-CCNC: 23 U/L (ref 0–33)
BILIRUB SERPL-MCNC: 0.3 MG/DL (ref 0.3–1.2)
BILIRUB UR QL STRIP: NEGATIVE
CLARITY UR: CLEAR
COLOR UR: YELLOW
CREAT BLD-MCNC: 0.82 MG/DL (ref 0.6–1.3)
DEPRECATED RDW RBC AUTO: 49.6 FL (ref 37–54)
ERYTHROCYTE [DISTWIDTH] IN BLOOD BY AUTOMATED COUNT: 13.9 % (ref 11.3–14.5)
GLUCOSE UR STRIP-MCNC: NEGATIVE MG/DL
HCT VFR BLD AUTO: 41.9 % (ref 34.5–44)
HGB BLD-MCNC: 13.4 G/DL (ref 11.5–15.5)
HGB UR QL STRIP.AUTO: ABNORMAL
KETONES UR QL STRIP: NEGATIVE
LDH SERPL-CCNC: 228 U/L (ref 120–246)
LEUKOCYTE ESTERASE UR QL STRIP.AUTO: ABNORMAL
MCH RBC QN AUTO: 31.2 PG (ref 27–31)
MCHC RBC AUTO-ENTMCNC: 32 G/DL (ref 32–36)
MCV RBC AUTO: 97.4 FL (ref 80–99)
NITRITE UR QL STRIP: NEGATIVE
PH UR STRIP.AUTO: 5.5 [PH] (ref 5–8)
PLATELET # BLD AUTO: 291 10*3/MM3 (ref 150–450)
PMV BLD AUTO: 11 FL (ref 6–12)
PROT UR QL STRIP: NEGATIVE
RBC # BLD AUTO: 4.3 10*6/MM3 (ref 3.89–5.14)
SP GR UR STRIP: >=1.03 (ref 1–1.03)
URATE SERPL-MCNC: 4.7 MG/DL (ref 3.1–7.8)
UROBILINOGEN UR QL STRIP: ABNORMAL
WBC NRBC COR # BLD: 8.29 10*3/MM3 (ref 3.5–10.8)

## 2018-10-25 PROCEDURE — 36415 COLL VENOUS BLD VENIPUNCTURE: CPT | Performed by: OBSTETRICS & GYNECOLOGY

## 2018-10-25 PROCEDURE — 84550 ASSAY OF BLOOD/URIC ACID: CPT | Performed by: OBSTETRICS & GYNECOLOGY

## 2018-10-25 PROCEDURE — 82247 BILIRUBIN TOTAL: CPT | Performed by: OBSTETRICS & GYNECOLOGY

## 2018-10-25 PROCEDURE — 0503F POSTPARTUM CARE VISIT: CPT | Performed by: OBSTETRICS & GYNECOLOGY

## 2018-10-25 PROCEDURE — 84460 ALANINE AMINO (ALT) (SGPT): CPT | Performed by: OBSTETRICS & GYNECOLOGY

## 2018-10-25 PROCEDURE — 83615 LACTATE (LD) (LDH) ENZYME: CPT | Performed by: OBSTETRICS & GYNECOLOGY

## 2018-10-25 PROCEDURE — 85027 COMPLETE CBC AUTOMATED: CPT | Performed by: OBSTETRICS & GYNECOLOGY

## 2018-10-25 PROCEDURE — 84450 TRANSFERASE (AST) (SGOT): CPT | Performed by: OBSTETRICS & GYNECOLOGY

## 2018-10-25 PROCEDURE — 84075 ASSAY ALKALINE PHOSPHATASE: CPT | Performed by: OBSTETRICS & GYNECOLOGY

## 2018-10-25 PROCEDURE — 82565 ASSAY OF CREATININE: CPT | Performed by: OBSTETRICS & GYNECOLOGY

## 2018-10-25 PROCEDURE — 81003 URINALYSIS AUTO W/O SCOPE: CPT

## 2018-10-25 RX ORDER — SUMATRIPTAN 50 MG/1
50 TABLET, FILM COATED ORAL ONCE AS NEEDED
Qty: 9 TABLET | Refills: 1 | Status: SHIPPED | OUTPATIENT
Start: 2018-10-25

## 2018-10-25 RX ORDER — IBUPROFEN 800 MG/1
TABLET ORAL
COMMUNITY
Start: 2018-10-19

## 2018-10-25 RX ORDER — POLYETHYLENE GLYCOL 3350 17 G/17G
17 POWDER, FOR SOLUTION ORAL DAILY
Qty: 30 EACH | Refills: 2 | Status: SHIPPED | OUTPATIENT
Start: 2018-10-25

## 2018-10-25 NOTE — PROGRESS NOTES
Subjective   Chief Complaint   Patient presents with   • Post-op     2 wks post vag del / BP check     Magnolia Lawson is a 38 y.o. year old  presenting to be seen for her postpartum visit.  She had a .     Since delivery she has not been sexually active.  She does not have concerns about post-partum blues/depression.  She is breast feeding and plans to continues for 6 month(s).  For ongoing contraception, she is considering oral progesterone-only contraceptive for birth control. Will start the .  She has history of migraine and a headache for 2 weeks not in same area - central tylenol and motrin no help ; no fiorcet due to nursing.   She wonders about her low backache.  Discussed this could be due to pregnancy being out of shape in proper lifting techniques.      The following portions of the patient's history were reviewed and updated as appropriate:problem list, current medications and allergies    Review of Systems   normal bladder and bowels     Objective   /80   Resp 16   Wt 76.2 kg (168 lb)   Breastfeeding? Yes   BMI 27.96 kg/m²     General:  well developed; well nourished  no acute distress  appears stated age   Breasts:  Engorged no erythema no skin lesions nipples appear healthy non-cracked    Abdomen: soft, non-tender; no masses  no umbilical or inginual hernias are present  no hepato-splenomegaly   Pelvis: Not performed.          Assessment   1. Borderline BP   2. Headaches hx migraines but this is different headache location.  Not responding to Fioricet she takes Motrin and Tylenol is persisted.  Not sure that this represents preeclampsia and blood pressures normally we'll check lab work PP CBC and urine protein  3. Pediatrician thinks maybe has thrush.  She wonders about something to prevent adhesive infection.  She can use the Vicodin on breast.  I do not see any abnormality today.     Plan   1. Cbc and urine protein and PEP  2. Refill meds for tear MiraLAX  3. Micatin given  fact baby has thrush    Medications Rx this encounter:  New Medications Ordered This Visit   Medications   • benzocaine-lanolin-aloe vera (DERMOPLAST) 20-0.5 % aerosol topical spray     Sig: Apply  topically to the appropriate area as directed 4 (Four) Times a Day As Needed for Mild Pain  (perineal pain).     Dispense:  56 g     Refill:  1   • polyethylene glycol (MIRALAX) powder     Sig: Take 17 g by mouth Daily. Dissolve in large glass of water and take daily     Dispense:  30 each     Refill:  2   • miconazole (MICOTIN) 2 % vaginal cream     Sig: Insert 1 applicator into the vagina Every Night.     Dispense:  45 g     Refill:  1   • SUMAtriptan (IMITREX) 50 MG tablet     Sig: Take 1 tablet by mouth 1 (One) Time As Needed for Migraine for up to 1 dose.     Dispense:  9 tablet     Refill:  1          This note was electronically signed.    Se Raya MD  October 25, 2018

## 2018-10-26 ENCOUNTER — TELEPHONE (OUTPATIENT)
Dept: OBSTETRICS AND GYNECOLOGY | Facility: CLINIC | Age: 38
End: 2018-10-26